# Patient Record
Sex: MALE | Race: WHITE | NOT HISPANIC OR LATINO | Employment: OTHER | URBAN - METROPOLITAN AREA
[De-identification: names, ages, dates, MRNs, and addresses within clinical notes are randomized per-mention and may not be internally consistent; named-entity substitution may affect disease eponyms.]

---

## 2017-11-09 ENCOUNTER — OFFICE VISIT (OUTPATIENT)
Dept: RHEUMATOLOGY | Facility: CLINIC | Age: 77
End: 2017-11-09
Payer: MEDICARE

## 2017-11-09 VITALS
HEART RATE: 75 BPM | BODY MASS INDEX: 27.72 KG/M2 | WEIGHT: 198 LBS | SYSTOLIC BLOOD PRESSURE: 111 MMHG | HEIGHT: 71 IN | DIASTOLIC BLOOD PRESSURE: 72 MMHG

## 2017-11-09 DIAGNOSIS — L50.9 URTICARIA: Primary | ICD-10-CM

## 2017-11-09 DIAGNOSIS — R76.8 POSITIVE ANA (ANTINUCLEAR ANTIBODY): ICD-10-CM

## 2017-11-09 PROCEDURE — 99999 PR PBB SHADOW E&M-EST. PATIENT-LVL III: CPT | Mod: PBBFAC,,, | Performed by: INTERNAL MEDICINE

## 2017-11-09 PROCEDURE — 99215 OFFICE O/P EST HI 40 MIN: CPT | Mod: S$PBB,,, | Performed by: INTERNAL MEDICINE

## 2017-11-09 PROCEDURE — 99213 OFFICE O/P EST LOW 20 MIN: CPT | Mod: PBBFAC,PO | Performed by: INTERNAL MEDICINE

## 2017-11-09 RX ORDER — PREDNISONE 5 MG/1
TABLET ORAL
Qty: 40 TABLET | Refills: 3 | Status: SHIPPED | OUTPATIENT
Start: 2017-11-09 | End: 2018-03-07

## 2017-11-09 RX ORDER — HYDROXYCHLOROQUINE SULFATE 200 MG/1
200 TABLET, FILM COATED ORAL 2 TIMES DAILY
Qty: 180 TABLET | Refills: 3 | Status: SHIPPED | OUTPATIENT
Start: 2017-11-09 | End: 2018-03-07 | Stop reason: SDUPTHER

## 2017-11-09 NOTE — PROGRESS NOTES
Subjective:          Chief Complaint: Kuldip Espinal is a 77 y.o. male who had concerns including Disease Management.    HPI:    Very pleasant 76y/o gentleman referred for + GEORGES and reported urticaria.   Rash returned March 2017 and past 5-6 weeks worse. Did have biopsy with Dr. Any Vitale/Dr. lCint Gastelum did biopsy on right arm. - questioned any drug to attribute this to. But stopped meds and not difference.   Dr. garcia-Immunogy/ seen twice considering Xolair  He has been on topical steroids. Steroids oral did help flares when comes off  Right hand with some excoritation looks like dishidrotic eczema  Scalp with scale.     No weakness, no fevers. Some chills. No SOB, no cough,   Irritation around his eyes itching and heavy but no rahs.     He is having slight fever.      Patient is here today to review lab work which showed a positive GEORGES 1-1280 homogeneous pattern with a negative GEORGES profile. Patient not having rashes at this time. He does note he has hx of eczema that is pruritic, typically on arms with scaling but denies erythema and severe scale. Rash seems to be exacerbated with stress, not sunlight. No involvement of scalp, ears, groin. He was seen by dermatology given topicals and this did help his rash to resolve. He denies dry eyes, nasal or palatal ulcerations, lymphadenopathy, serositis, photosensitivity.+ hx of significant CAD with 7 stents currently on Plavix. He denies seizure, stroke, anemia, nephritis, other rashes, Raynaud's, IBD, hx of thyroid disease. He denies swelling of joints and major pain generator was left knee with resolution of pain following TKA with Dr. Suárez. No morning stiffness or swelling of joints.       REVIEW OF SYSTEMS:    Review of Systems   Constitutional: Negative for fever, malaise/fatigue and weight loss.   HENT: Negative for sore throat.    Eyes: Negative for double vision, photophobia and redness.   Respiratory: Negative for cough, shortness of breath and  wheezing.    Cardiovascular: Negative for chest pain, palpitations and orthopnea.   Gastrointestinal: Negative for abdominal pain, constipation and diarrhea.   Genitourinary: Negative for dysuria, hematuria and urgency.   Musculoskeletal: Negative for back pain, joint pain, myalgias and neck pain.   Skin: Negative for rash.   Neurological: Negative for dizziness, tingling, focal weakness and headaches.   Endo/Heme/Allergies: Does not bruise/bleed easily.   Psychiatric/Behavioral: Negative for depression, hallucinations and suicidal ideas.               Objective:            Past Medical History:   Diagnosis Date    a CAD With Stenting     Dr[facility] Eleuterio Knott [facility]    b Hypertension     Norvasc 5 mg D/Cd On 8/28/15    b Stage 3 CKD     Dr. Kuldip hollis Hypercholesterolemia With Low HDL     On Crestor 20 Mg qHS    c Hypertriglyceridemia     On Lovaza 2 Gm Bid    d Type 2 DM     5/24/17 RXd Amaryl 4 Mg Bid And D/Cd Glucotrol 10 Mg Bid; 11/22/16 Added Januvia 50 Mg Daily To His Glucotrol 10 Mg Bid; He Has Had Full ADA Training With Bree Shepard    i Pulmonary Interstitial Fibrosis On 03/2015 Chest CT     j Asymptomatic cholelithiasis     On 03/2015 Chest CT     j Colon CA S/P Partial Colectomy 2008     Dr. Hayes benavidez Diverticulosis     Dr. Hayes martinez BPH     Dr. Norris Florian; On Cialis 5 Mg Daily For This    k Elevated PSA Levels     Dr. Norris Florian; I Always Send Lab Results To Dr. Norris art GEORGES positive     Dr. Adeola art Chronic left shoulder pain     Dr. Heike art H/O Left TKR 02/2013     Dr. Heike art Lumbar Spinal DDD     I Will Refer Him To Neurosurgery    l Thoracic Spinal DDD     On 03/2015 Chest CT     n Situational Anxiety And Depression     RTC 6 Weeks; RXd Wellbutrin  Mg qAM; Lexapro Also Worked Well    q Chronic Eczema     q Toenail Onychomycosis (07/2015)     RXd Lamisil 7/17/15    q Urticaria 12/2014     Dr. Mir  Mello winkler Vitamin D deficiency     Wellness Visit 5/24/2017      Family History   Problem Relation Age of Onset    Cancer Mother     Heart disease Father     Hyperlipidemia Father     Hypertension Father      Social History   Substance Use Topics    Smoking status: Former Smoker    Smokeless tobacco: Not on file    Alcohol use Not on file         Current Outpatient Prescriptions on File Prior to Visit   Medication Sig Dispense Refill    amlodipine (NORVASC) 5 MG tablet Take 1 tablet (5 mg total) by mouth once daily. 90 tablet 3    aspirin (ECOTRIN) 81 MG EC tablet Take 1 tablet by mouth once daily.      blood sugar diagnostic (BLOOD GLUCOSE TEST) Strp MARKIE CONTOUR NEXT TEST STRP      blood sugar diagnostic (BLOOD GLUCOSE TEST) [facility] MARKIE MICROLET LANCETS MISC      cholecalciferol, vitamin D3, 5,000 unit capsule Take 1 capsule by mouth once daily.      CIALIS 5 mg tablet Take 5 mg by mouth once daily.  99    clopidogrel (PLAVIX) 75 mg tablet Take 1 tablet by mouth once daily.      clotrimazole-betamethasone 1-0.05% (LOTRISONE) cream Apply topically 2 (two) times daily. Apply topically to affected area bid 45 g 0    CRESTOR 20 mg tablet Take 1 tablet by mouth once daily.      glimepiride (AMARYL) 4 MG tablet Take 1 tablet (4 mg total) by mouth before breakfast. 180 tablet 3    hydrochlorothiazide (HYDRODIURIL) 25 MG tablet Take 1 tablet by mouth once daily.      metoprolol succinate (TOPROL-XL) 25 MG 24 hr tablet TAKE 1 TABLET EVERY MORNING 90 tablet 1    omega-3 acid ethyl esters (LOVAZA) 1 gram capsule Take 2 capsules by mouth 2 (two) times daily.      ramipril (ALTACE) 10 MG capsule Take 10 mg by mouth 2 (two) times daily.  2    SITagliptan (JANUVIA) 50 MG Tab Take 1 tablet (50 mg total) by mouth once daily. 90 tablet 3    vitamin A-vitamin C-vit E-min Tab [facility]       No current facility-administered medications on file prior to visit.        Vitals:    11/09/17  1555   BP: 111/72   Pulse: 75       Physical Exam:    Physical Exam   Constitutional: He appears well-developed and well-nourished.   HENT:   Head: Atraumatic.   Nose: No nasal deformity.   Mouth/Throat: No oral lesions. No dental caries.   Eyes: Pupils are equal, round, and reactive to light. Right conjunctiva is not injected. Left conjunctiva is not injected.   Neck: No JVD present.   Cardiovascular: Normal rate and regular rhythm.    Pulmonary/Chest: Effort normal and breath sounds normal.   Abdominal: Soft. Bowel sounds are normal. There is no hepatosplenomegaly.   Musculoskeletal:        Right shoulder: He exhibits normal range of motion, no tenderness, no effusion and no crepitus.        Left shoulder: He exhibits normal range of motion, no tenderness, no effusion and no crepitus.        Right elbow: He exhibits normal range of motion and no effusion. No tenderness found.        Left elbow: He exhibits normal range of motion and no effusion. No tenderness found.        Right wrist: He exhibits normal range of motion, no tenderness and no swelling.        Left wrist: He exhibits normal range of motion, no tenderness and no swelling.        Right hip: He exhibits normal range of motion, no tenderness and no swelling.        Left hip: He exhibits normal range of motion, no tenderness and no swelling.        Right knee: He exhibits normal range of motion and no swelling. No tenderness found.        Left knee: He exhibits normal range of motion and no swelling. No tenderness found.        Right ankle: He exhibits normal range of motion and no swelling. No tenderness.        Left ankle: He exhibits normal range of motion and no swelling. No tenderness.   Left knee hx of TKA  [place] nodes and ned nodes PIP and DIP joints.   Lymphadenopathy:     He has no cervical adenopathy.   Neurological: He has normal strength.   Skin: Skin is warm, dry and intact.   Psychiatric: He has a normal mood and affect.              Assessment:       No diagnosis found.       Plan:        GEORGES positive       - Patient with high titer +GEORGES and no evidence of major organ involvement or arthralgias. Rash most suggestive by history of eczema vs psoriasis. Discussed there is no indication for therapy at this time. Discussed typic symptoms that would warrant re-evaluation or therapy.   -? If this is Pressure urticaria, no vasculitis              No Follow-up on file.

## 2017-11-15 ENCOUNTER — TELEPHONE (OUTPATIENT)
Dept: RHEUMATOLOGY | Facility: CLINIC | Age: 77
End: 2017-11-15

## 2017-11-15 NOTE — TELEPHONE ENCOUNTER
----- Message from Shelia Carranza sent at 11/14/2017 10:17 AM CST -----  Patient called to informed he is scheduled with Dr. King on 11/16/17 at 3:30, contact, contact patient at [phone],  He also wanted to give Dr. Nia King phone # [phone]       tahnk you

## 2017-11-15 NOTE — TELEPHONE ENCOUNTER
Returned patient's call. Patient states Dr. Sarkar was suppose to contact patient's immunologist Dr. Lal regarding starting Xolair injection. Patient called to office to inform he has appt tomorrow 11/16 @ 3:30 with Dr. Presley if Dr. Sarkar would like to get in touch with Dr. Presley.

## 2017-11-16 NOTE — PROGRESS NOTES
Subjective:          Chief Complaint: Kuldip Espinal is a 77 y.o. male who had concerns including Disease Management.    HPI:      Very pleasant 76y/o gentleman referred for + GEORGES and reported urticaria.   Rash returned March 2017 and past 5-6 weeks worse. Did have biopsy with Dr. Any Vitale/Dr. Clint Gastelum did biopsy on right arm. - questioned any drug to attribute this to. But stopped meds and not difference.   Dr. garcia-Immunogy/ seen twice considering Xolair  He has been on topical steroids. Steroids oral did help flares when comes off  Right hand with some excoritation looks like dishidrotic eczema  Scalp with scale.     No weakness, no fevers. Some chills. No SOB, no cough,   Irritation around his eyes itching and heavy but no rahs.     He is having slight fever.      Patient is here today to review lab work which showed a positive GEORGES 1-1280 homogeneous pattern with a negative GEORGES profile. Patient not having rashes at this time. He does note he has hx of eczema that is pruritic, typically on arms with scaling but denies erythema and severe scale. Rash seems to be exacerbated with stress, not sunlight. No involvement of scalp, ears, groin. He was seen by dermatology given topicals and this did help his rash to resolve. He denies dry eyes, nasal or palatal ulcerations, lymphadenopathy, serositis, photosensitivity.+ hx of significant CAD with 7 stents currently on Plavix. He denies seizure, stroke, anemia, nephritis, other rashes, Raynaud's, IBD, hx of thyroid disease. He denies swelling of joints and major pain generator was left knee with resolution of pain following TKA with Dr. Suárez. No morning stiffness or swelling of joints.         Very pleasant 76y/o gentleman referred for + GEORGES and reported urticaria.  Patient is here today to review lab work which showed a positive GEORGES 1-1280 homogeneous pattern with a negative GEORGES profile. Patient not having rashes at this time. He does note he has hx  of eczema that is pruritic, typically on arms with scaling but denies erythema and severe scale. Rash seems to be exacerbated with stress, not sunlight. No involvement of scalp, ears, groin. He was seen by dermatology given topicals and this did help his rash to resolve. He denies dry eyes, nasal or palatal ulcerations, lymphadenopathy, serositis, photosensitivity.+ hx of significant CAD with 7 stents currently on Plavix. He denies seizure, stroke, anemia, nephritis, other rashes, Raynaud's, IBD, hx of thyroid disease. He denies swelling of joints and major pain generator was left knee with resolution of pain following TKA with Dr. Suárez. No morning stiffness or swelling of joints.       REVIEW OF SYSTEMS:    Review of Systems   Constitutional: Negative for fever, malaise/fatigue and weight loss.   HENT: Negative for sore throat.    Eyes: Negative for double vision, photophobia and redness.   Respiratory: Negative for cough, shortness of breath and wheezing.    Cardiovascular: Negative for chest pain, palpitations and orthopnea.   Gastrointestinal: Negative for abdominal pain, constipation and diarrhea.   Genitourinary: Negative for dysuria, hematuria and urgency.   Musculoskeletal: Negative for back pain, joint pain and myalgias.   Skin: Positive for itching and rash.   Neurological: Negative for dizziness, tingling, focal weakness and headaches.   Endo/Heme/Allergies: Does not bruise/bleed easily.   Psychiatric/Behavioral: Negative for depression, hallucinations and suicidal ideas.               Objective:            Past Medical History:   Diagnosis Date    a CAD With Stenting     Dr[facility]    b Hypertension     Norvasc 5 mg D/Cd On 8/28/15    b Stage 3 CKD     Dr. Kuldip hollis Hypercholesterolemia With Low HDL     On Crestor 20 Mg qHS    c Hypertriglyceridemia     On Lovaza 2 Gm Bid    d Type 2 DM     5/24/17 RXd Amaryl 4 Mg Bid And D/Cd Glucotrol 10 Mg Bid; 11/22/16 Added Januvia 50  Mg Daily To His Glucotrol 10 Mg Bid; He Has Had Full ADA Training With Bree Muhammad i Pulmonary Interstitial Fibrosis On 03/2015 Chest CT     j Asymptomatic cholelithiasis     On 03/2015 Chest CT     j Colon CA S/P Partial Colectomy 2008     Dr. Hayes benavidez Diverticulosis     Dr. Hayes martinez BPH     Dr. Norris Florian; On Cialis 5 Mg Daily For This    k Elevated PSA Levels     Dr. Norris Florian; I Always Send Lab Results To Dr. Norris art GEORGES positive     Dr. Adeola art Chronic left shoulder pain     Dr. Heike art H/O Left TKR 02/2013     Dr. Heike art Lumbar Spinal DDD     I Will Refer Him To Neurosurgery    l Thoracic Spinal DDD     On 03/2015 Chest CT     n Situational Anxiety And Depression     RTC 6 Weeks; RXd Wellbutrin  Mg qAM; Lexapro Also Worked Well    q Chronic Eczema     q Toenail Onychomycosis (07/2015)     RXd Lamisil 7/17/15    q Urticaria 12/2014     Dr. Adeola winkler Vitamin D deficiency     Wellness Visit 5/24/2017      Family History   Problem Relation Age of Onset    Cancer Mother     Heart disease Father     Hyperlipidemia Father     Hypertension Father      Social History   Substance Use Topics    Smoking status: Former Smoker    Smokeless tobacco: Not on file    Alcohol use Not on file         Current Outpatient Prescriptions on File Prior to Visit   Medication Sig Dispense Refill    amlodipine (NORVASC) 5 MG tablet Take 1 tablet (5 mg total) by mouth once daily. 90 tablet 3    aspirin (ECOTRIN) 81 MG EC tablet Take 1 tablet by mouth once daily.      blood sugar diagnostic (BLOOD GLUCOSE TEST) Strp MARKIE CONTOUR NEXT TEST STRP      blood sugar diagnostic (BLOOD GLUCOSE TEST) Strp MARKIE MICROLET LANCETS MISC      cholecalciferol, vitamin D3, 5,000 unit capsule Take 1 capsule by mouth once daily.      CIALIS 5 mg tablet Take 5 mg by mouth once daily.  99    clopidogrel (PLAVIX) 75 mg tablet Take 1  tablet by mouth once daily.      clotrimazole-betamethasone 1-0.05% (LOTRISONE) cream Apply topically 2 (two) times daily. Apply topically to affected area bid 45 g 0    CRESTOR 20 mg tablet Take 1 tablet by mouth once daily.      glimepiride (AMARYL) 4 MG tablet Take 1 tablet (4 mg total) by mouth before breakfast. 180 tablet 3    hydrochlorothiazide (HYDRODIURIL) 25 MG tablet Take 1 tablet by mouth once daily.      metoprolol succinate (TOPROL-XL) 25 MG 24 hr tablet TAKE 1 TABLET EVERY MORNING 90 tablet 1    omega-3 acid ethyl esters (LOVAZA) 1 gram capsule Take 2 capsules by mouth 2 (two) times daily.      ramipril (ALTACE) 10 MG capsule Take 10 mg by mouth 2 (two) times daily.  2    SITagliptan (JANUVIA) 50 MG Tab Take 1 tablet (50 mg total) by mouth once daily. 90 tablet 3    vitamin A-vitamin C-vit E-min Tab [facility]       No current facility-administered medications on file prior to visit.        Vitals:    11/09/17 1555   BP: 111/72   Pulse: 75       Physical Exam:    Physical Exam   Constitutional: He is oriented to person, place, and time. He appears well-developed and well-nourished.   HENT:   Head: Normocephalic.   Right Ear: Hearing normal.   Left Ear: Hearing normal.   Nose: No rhinorrhea.   Mouth/Throat: Uvula is midline, oropharynx is clear and moist and mucous membranes are normal.   Eyes: Conjunctivae and EOM are normal. Pupils are equal, round, and reactive to light.   Cardiovascular: Normal rate, regular rhythm and normal heart sounds.    Pulmonary/Chest: Effort normal and breath sounds normal.   Musculoskeletal:        Right shoulder: He exhibits normal range of motion, no tenderness and no swelling.        Left shoulder: He exhibits normal range of motion, no tenderness and no swelling.        Right wrist: He exhibits normal range of motion, no tenderness and no swelling.        Left wrist: He exhibits normal range of motion, no tenderness and no swelling.         Right knee: He exhibits normal range of motion and no swelling. No tenderness found.        Left knee: He exhibits normal range of motion and no swelling. No tenderness found.        Right ankle: He exhibits normal range of motion and no swelling. No tenderness.        Left ankle: He exhibits normal range of motion and no swelling. No tenderness.        Right hand: He exhibits normal range of motion, no tenderness and no swelling.        Left hand: He exhibits normal range of motion, no tenderness and no swelling.        Right foot: There is normal range of motion, no tenderness and no swelling.        Left foot: There is normal range of motion, no tenderness and no swelling.   Neurological: He is oriented to person, place, and time. He has normal strength.   Skin: Skin is warm, dry and intact.   Diffuse urticaria ABD, arms, legs.face scalp   Psychiatric: He has a normal mood and affect. His speech is normal and behavior is normal. Cognition and memory are normal.             Assessment:       Encounter Diagnoses   Name Primary?    Urticaria Yes    Positive GEORGES (antinuclear antibody)           Plan:        Urticaria  -     hydroxychloroquine (PLAQUENIL) 200 mg tablet; Take 1 tablet (200 mg total) by mouth 2 (two) times daily.  Dispense: 180 tablet; Refill: 3  -     Sedimentation rate, manual; Future; Expected date: 11/09/2017  -     C-reactive protein; Future; Expected date: 11/23/2017  -     Sjogrens syndrome-A extractable nuclear antibody; Future; Expected date: 11/09/2017  -     Anti-neutrophilic cytoplasmic antibody; Future; Expected date: 11/09/2017  -     Sjogrens syndrome-B extractable nuclear antibody; Future; Expected date: 11/09/2017  -     Proteinase 3 Autoantibodies; Future; Expected date: 11/09/2017  -     Myeloperoxidase Antibody (MPO); Future; Expected date: 11/09/2017    Positive GEORGES (antinuclear antibody)  -     hydroxychloroquine (PLAQUENIL) 200 mg tablet; Take 1 tablet (200 mg total) by mouth 2  (two) times daily.  Dispense: 180 tablet; Refill: 3  -     Sedimentation rate, manual; Future; Expected date: 11/09/2017  -     C-reactive protein; Future; Expected date: 11/23/2017  -     Sjogrens syndrome-A extractable nuclear antibody; Future; Expected date: 11/09/2017  -     Anti-neutrophilic cytoplasmic antibody; Future; Expected date: 11/09/2017  -     Sjogrens syndrome-B extractable nuclear antibody; Future; Expected date: 11/09/2017  -     Proteinase 3 Autoantibodies; Future; Expected date: 11/09/2017  -     Myeloperoxidase Antibody (MPO); Future; Expected date: 11/09/2017    Other orders  -     predniSONE (DELTASONE) 5 MG tablet; 2 tabs daily for 10 days , then 1 tab daily for 20 days  Dispense: 40 tablet; Refill: 3    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   No clinical evidence for SLE/no patho to suggest cutaneous lupus.    -Cannot ascertain any specific medication or exposure to attribute to hypersensitivity.    -++eosinophila    -discussed with patient that unclear etiology of rash, but HCQ can help in some cases.   Ok should he want to start Xolair.       Return in about 4 weeks (around 12/7/2017).        40min consultation with greater than 50% spent in counseling, chart review and coordination of care. All questions answered.

## 2017-11-16 NOTE — PROGRESS NOTES
Subjective:          Chief Complaint: Kuldip Espinal is a 77 y.o. male who had concerns including Disease Management.    HPI:    Very pleasant 76y/o gentleman referred for + GEORGES (no lab received to check titer or pattern) and reported urticaria. Patient not having rashes at this time. He does note he has hx of eczema that is pruritic, typically on arms with scaling but denies erythema and severe scale. Rash seems to be exacerbated with stress, not sunlight. No involvement of scalp, ears, groin. He was seen by dermatology given topicals and this did help his rash to resolve. He denies dry eyes, nasal or palatal ulcerations, lymphadenopathy, serositis, photosensitivity.+ hx of significant CAD with 7 stents currently on Plavix. He denies seizure, stroke, anemia, nephritis, other rashes, Raynaud's, IBD, hx of thyroid disease. He denies swelling of joints and major pain generator was left knee with resolution of pain following TKA with Dr. Suárez. No morning stiffness or swelling of joints.     REVIEW OF SYSTEMS:    Review of Systems   Constitutional: Negative for fever, malaise/fatigue and weight loss.   HENT: Negative for sore throat.    Eyes: Negative for double vision, photophobia and redness.   Respiratory: Negative for cough, shortness of breath and wheezing.    Cardiovascular: Negative for chest pain, palpitations and orthopnea.   Gastrointestinal: Negative for abdominal pain, constipation and diarrhea.   Genitourinary: Negative for dysuria, hematuria and urgency.   Musculoskeletal: Negative for back pain, joint pain, myalgias and neck pain.   Skin: Negative for rash.   Neurological: Negative for dizziness, tingling, focal weakness and headaches.   Endo/Heme/Allergies: Does not bruise/bleed easily.   Psychiatric/Behavioral: Negative for depression, hallucinations and suicidal ideas.               Objective:            Past Medical History:   Diagnosis Date    a CAD With Stenting     Dr. Eleuterio Knott At [facility]     b Hypertension     Norvasc 5 mg D/Cd On 8/28/15    b Stage 3 CKD     Dr. Kuldip hollis Hypercholesterolemia With Low HDL     On Crestor 20 Mg qHS    c Hypertriglyceridemia     On Lovaza 2 Gm Bid    d Type 2 DM     5/24/17 RXd Amaryl 4 Mg Bid And D/Cd Glucotrol 10 Mg Bid; 11/22/16 Added Januvia 50 Mg Daily To His Glucotrol 10 Mg Bid; He Has Had Full ADA Training With Bree Muhammad i Pulmonary Interstitial Fibrosis On 03/2015 Chest CT     j Asymptomatic cholelithiasis     On 03/2015 Chest CT     j Colon CA S/P Partial Colectomy 2008     Dr. Hayes benavidez Diverticulosis     Dr. Hayes martinez BPH     Dr. Norris Florian; On Cialis 5 Mg Daily For This    k Elevated PSA Levels     Dr. Norris Florian; I Always Send Lab Results To Dr. Norris art GEORGES positive     Dr. Adeola art Chronic left shoulder pain     Dr. Heike art H/O Left TKR 02/2013     Dr. Heike art Lumbar Spinal DDD     I Will Refer Him To Neurosurgery    l Thoracic Spinal DDD     On 03/2015 Chest CT     n Situational Anxiety And Depression     RTC 6 Weeks; RXd Wellbutrin  Mg qAM; Lexapro Also Worked Well    q Chronic Eczema     q Toenail Onychomycosis (07/2015)     RXd Lamisil 7/17/15    q Urticaria 12/2014     Dr. Adeola winkler Vitamin D deficiency     Wellness Visit 5/24/2017      Family History   Problem Relation Age of Onset    Cancer Mother     Heart disease Father     Hyperlipidemia Father     Hypertension Father      Social History   Substance Use Topics    Smoking status: Former Smoker    Smokeless tobacco: Not on file    Alcohol use Not on file         Current Outpatient Prescriptions on File Prior to Visit   Medication Sig Dispense Refill    amlodipine (NORVASC) 5 MG tablet Take 1 tablet (5 mg total) by mouth once daily. 90 tablet 3    aspirin (ECOTRIN) 81 MG EC tablet Take 1 tablet by mouth once daily.      blood sugar diagnostic (BLOOD GLUCOSE TEST) Strp  MARKIE CONTOUR NEXT TEST STRP      blood sugar diagnostic (BLOOD GLUCOSE TEST) Strp MARKIE MICROLET LANCETS MISC      cholecalciferol, vitamin D3, 5,000 unit capsule Take 1 capsule by mouth once daily.      CIALIS 5 mg tablet Take 5 mg by mouth once daily.  99    clopidogrel (PLAVIX) 75 mg tablet Take 1 tablet by mouth once daily.      clotrimazole-betamethasone 1-0.05% (LOTRISONE) cream Apply topically 2 (two) times daily. Apply topically to affected area bid 45 g 0    CRESTOR 20 mg tablet Take 1 tablet by mouth once daily.      glimepiride (AMARYL) 4 MG tablet Take 1 tablet (4 mg total) by mouth before breakfast. 180 tablet 3    hydrochlorothiazide (HYDRODIURIL) 25 MG tablet Take 1 tablet by mouth once daily.      metoprolol succinate (TOPROL-XL) 25 MG 24 hr tablet TAKE 1 TABLET EVERY MORNING 90 tablet 1    omega-3 acid ethyl esters (LOVAZA) 1 gram capsule Take 2 capsules by mouth 2 (two) times daily.      ramipril (ALTACE) 10 MG capsule Take 10 mg by mouth 2 (two) times daily.  2    SITagliptan (JANUVIA) 50 MG Tab Take 1 tablet (50 mg total) by mouth once daily. 90 tablet 3    vitamin A-vitamin C-vit E-min Tab OCUVITE EYE HEALTH FORMULA CAPS       No current facility-administered medications on file prior to visit.        Vitals:    11/09/17 1555   BP: 111/72   Pulse: 75       Physical Exam:    Physical Exam   Constitutional: He appears well-developed and well-nourished.   HENT:   Head: Atraumatic.   Nose: No nasal deformity.   Mouth/Throat: No oral lesions. No dental caries.   Eyes: Pupils are equal, round, and reactive to light. Right conjunctiva is not injected. Left conjunctiva is not injected.   Neck: No JVD present.   Cardiovascular: Normal rate and regular rhythm.    Pulmonary/Chest: Effort normal and breath sounds normal.   Abdominal: Soft. Bowel sounds are normal. There is no hepatosplenomegaly.   Musculoskeletal:        Right shoulder: He exhibits normal range of motion, no tenderness, no  effusion and no crepitus.        Left shoulder: He exhibits normal range of motion, no tenderness, no effusion and no crepitus.        Right elbow: He exhibits normal range of motion and no effusion. No tenderness found.        Left elbow: He exhibits normal range of motion and no effusion. No tenderness found.        Right wrist: He exhibits normal range of motion, no tenderness and no swelling.        Left wrist: He exhibits normal range of motion, no tenderness and no swelling.        Right hip: He exhibits normal range of motion, no tenderness and no swelling.        Left hip: He exhibits normal range of motion, no tenderness and no swelling.        Right knee: He exhibits normal range of motion and no swelling. No tenderness found.        Left knee: He exhibits normal range of motion and no swelling. No tenderness found.        Right ankle: He exhibits normal range of motion and no swelling. No tenderness.        Left ankle: He exhibits normal range of motion and no swelling. No tenderness.   Left knee hx of TKA  [place]berden nodes and ned nodes PIP and DIP joints.   Lymphadenopathy:     He has no cervical adenopathy.   Neurological: He has normal strength.   Skin: Skin is warm, dry and intact.   Psychiatric: He has a normal mood and affect.             Assessment:       Encounter Diagnoses   Name Primary?    Urticaria Yes    Positive GEORGES (antinuclear antibody)           Plan:        GEORGES positive  Orders:  -     GEORGES; Future; Expected date: 1/26/16  -     Anti Sm/RNP Antibody; Future; Expected date: 1/26/16  -     Anti-DNA antibody, double-stranded; Future; Expected date: 1/26/16  -     Anti-Histone Antibody; Future; Expected date: 1/26/16  -     Anti-Smith antibody; Future; Expected date: 1/26/16  -     Beta 2 Glycoprotein I Antibody, IgA; Future; Expected date: 1/26/16  -     C3 complement; Future; Expected date: 1/26/16  -     C4 complement; Future; Expected date: 1/26/16  -     Sjogrens syndrome-B  extractable nuclear antibody; Future; Expected date: 1/26/16  -     Sjogrens syndrome-A extractable nuclear antibody; Future; Expected date: 1/26/16  -     Complement, total; Future; Expected date: 1/26/16  -     Rheumatoid factor; Future; Expected date: 2/7/16    Coronary artery disease due to lipid rich plaque - not an NSAID candidate. Discussed APAP for any arthralgias    Very pleasant 74 y/o gentleman with historical +GEORGES and no clinical evidence of SLE or other CTD. 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 Will repeat GEORGES profile. If + would check for thyroid antibodies or suspect incidental.    Return in about 4 weeks (around 12/7/2017).

## 2017-11-22 ENCOUNTER — TELEPHONE (OUTPATIENT)
Dept: RHEUMATOLOGY | Facility: CLINIC | Age: 77
End: 2017-11-22

## 2017-11-22 NOTE — TELEPHONE ENCOUNTER
----- Message from Laxmi Guillory sent at 11/22/2017  8:12 AM CST -----  Contact: Kuldip Boateng is calling to ask for lab orders to be sent to [facility] on [address], fax [phone] as he misplaced the ones he had been given. Thanks!     LM on VM that labs were faxed to [facility] 9:51 AM. Call back number is provided if needed. TERRENCE

## 2017-11-29 ENCOUNTER — TELEPHONE (OUTPATIENT)
Dept: RHEUMATOLOGY | Facility: CLINIC | Age: 77
End: 2017-11-29

## 2017-11-29 NOTE — TELEPHONE ENCOUNTER
----- Message from Marie Rodriguez sent at 11/29/2017  9:34 AM CST -----  Contact: Shawnee freedman/Pankaj [phone]  Please call her, she needs to know what the Anti-neutrophilic cytoplasmic antibody is.  It is not pulling up in her system.  Patient is there waiting.  Call placed to pod and instant messaged. Thank you!    Spoke to Shawnee-she found under ANCA. CG

## 2017-12-04 ENCOUNTER — OFFICE VISIT (OUTPATIENT)
Dept: RHEUMATOLOGY | Facility: CLINIC | Age: 77
End: 2017-12-04
Payer: MEDICARE

## 2017-12-04 VITALS
HEART RATE: 76 BPM | SYSTOLIC BLOOD PRESSURE: 140 MMHG | HEIGHT: 71 IN | DIASTOLIC BLOOD PRESSURE: 80 MMHG | BODY MASS INDEX: 27.4 KG/M2 | WEIGHT: 195.75 LBS

## 2017-12-04 DIAGNOSIS — R76.8 ANA POSITIVE: Primary | ICD-10-CM

## 2017-12-04 DIAGNOSIS — L30.9 ECZEMA, UNSPECIFIED TYPE: ICD-10-CM

## 2017-12-04 PROCEDURE — 99213 OFFICE O/P EST LOW 20 MIN: CPT | Mod: PBBFAC,PO | Performed by: INTERNAL MEDICINE

## 2017-12-04 PROCEDURE — 99999 PR PBB SHADOW E&M-EST. PATIENT-LVL III: CPT | Mod: PBBFAC,,, | Performed by: INTERNAL MEDICINE

## 2017-12-04 PROCEDURE — 99214 OFFICE O/P EST MOD 30 MIN: CPT | Mod: S$PBB,,, | Performed by: INTERNAL MEDICINE

## 2017-12-04 NOTE — PATIENT INSTRUCTIONS
Selsun Blue every other day  Neutrogena Tgel every other day.   Steroid shampoo every day for 10 days.

## 2017-12-04 NOTE — PROGRESS NOTES
Subjective:          Chief Complaint: Kuldip Espinal is a 77 y.o. male who had concerns including Disease Management.    HPI:      Very pleasant 74y/o gentleman referred for + GEORGES and reported urticaria. Sking better on Pred taper with HCQ. No ASE. Labs for ANCA negative. ESR WNL, CRP 18.8.   Scalp still pruritic but 90% improved on remaining skin with exception of pruritis.   Rash returned March 2017 and past 5-6 weeks worse.   Hx of eczema which I think is what is remaining at this time.   Did have biopsy with Dr. Any Vitale/Dr. Clint Gastelum did biopsy on right arm. - questioned any drug to attribute this to. But stopped meds and not difference.   Dr. Rogel-Immunogy/ seen twice considering Xolair  He has been on topical steroids. Steroids oral did help flares when comes off  Right hand with some excoritation looks like dishidrotic eczema  Scalp with scale.     No weakness, no fevers. Some chills. No SOB, no cough,   Irritation around his eyes itching and heavy but no rahs.     GEORGES 1-1280 homogeneous pattern with a negative GEORGES profile. He does note he has hx of eczema that is pruritic, typically on arms with scaling but denies erythema and severe scale. Rash seems to be exacerbated with stress, not sunlight.  Patient denies weight loss, rashes, dry eye, dry mouth, nasal or palatal ulcerations,  lymphadenopathy, Raynaud's, hx of DVT/miscarriages, psoriasis or family hx of psoriasis, rashes, serositis, anemia or other constitutional symptoms.          REVIEW OF SYSTEMS:    Review of Systems   Constitutional: Negative for fever, malaise/fatigue and weight loss.   HENT: Negative for sore throat.    Eyes: Negative for double vision, photophobia and redness.   Respiratory: Negative for cough, shortness of breath and wheezing.    Cardiovascular: Negative for chest pain, palpitations and orthopnea.   Gastrointestinal: Negative for abdominal pain, constipation and diarrhea.   Genitourinary: Negative for  dysuria, hematuria and urgency.   Musculoskeletal: Negative for back pain, joint pain and myalgias.   Skin: Positive for itching and rash.   Neurological: Negative for dizziness, tingling, focal weakness and headaches.   Endo/Heme/Allergies: Does not bruise/bleed easily.   Psychiatric/Behavioral: Negative for depression, hallucinations and suicidal ideas.               Objective:            Past Medical History:   Diagnosis Date    a CAD With Stenting     [facility]    b Hypertension     Norvasc 5 mg D/Cd On 8/28/15    b Stage 3 CKD     Dr. Kuldip hollis Hypercholesterolemia With Low HDL     On Crestor 20 Mg qHS    c Hypertriglyceridemia     On Lovaza 2 Gm Bid    d Type 2 DM     5/24/17 RXd Amaryl 4 Mg Bid And D/Cd Glucotrol 10 Mg Bid; 11/22/16 Added Januvia 50 Mg Daily To His Glucotrol 10 Mg Bid; He Has Had Full ADA Training With Bree Muhammad i Pulmonary Interstitial Fibrosis On 03/2015 Chest CT     j Asymptomatic cholelithiasis     On 03/2015 Chest CT     j Colon CA S/P Partial Colectomy 2008     Dr. Hayes benavidez Diverticulosis     Dr. Hayes Restrepo    k BPH     Dr. Norris Florian; On Cialis 5 Mg Daily For This    k Elevated PSA Levels     Dr. Norris Florian; I Always Send Lab Results To Dr. Norris art GEORGES positive     Dr. Adeola art Chronic left shoulder pain     Dr. Heike art H/O Left TKR 02/2013     Dr. Heike art Lumbar Spinal DDD     I Will Refer Him To Neurosurgery    l Thoracic Spinal DDD     On 03/2015 Chest CT     n Situational Anxiety And Depression     RTC 6 Weeks; RXd Wellbutrin  Mg qAM; Lexapro Also Worked Well    q Chronic Eczema     Dr. Adeola Sarkar; Dr. Erna Gastelum    q Recurrent Urticaria Vasculitis     Dr. Adeola Sarkar    q Toenail Onychomycosis (07/2015)     RXd Lamisil 7/17/15    q Vitamin D deficiency     Wellness Visit 5/24/2017      Family History   Problem Relation Age of Onset    Cancer Mother      Heart disease Father     Hyperlipidemia Father     Hypertension Father      Social History   Substance Use Topics    Smoking status: Former Smoker    Smokeless tobacco: Not on file    Alcohol use Not on file         Current Outpatient Prescriptions on File Prior to Visit   Medication Sig Dispense Refill    amLODIPine (NORVASC) 2.5 MG tablet Take 2.5 mg by mouth every morning.  3    aspirin (ECOTRIN) 81 MG EC tablet Take 1 tablet by mouth once daily.      blood sugar diagnostic (BLOOD GLUCOSE TEST) Strp MARKIE CONTOUR NEXT TEST STRP      blood sugar diagnostic (BLOOD GLUCOSE TEST) [facility] MARKIE MICROLET LANCETS MISC      cetirizine (ZYRTEC) 10 MG tablet Take 10 mg by mouth every morning.  1    cholecalciferol, vitamin D3, 5,000 unit capsule Take 1 capsule by mouth once daily.      CIALIS 5 mg tablet Take 5 mg by mouth once daily.  99    clopidogrel (PLAVIX) 75 mg tablet Take 1 tablet by mouth once daily.      clotrimazole-betamethasone 1-0.05% (LOTRISONE) cream Apply topically 2 (two) times daily. Apply topically to affected area bid 45 g 0    CRESTOR 20 mg tablet Take 1 tablet by mouth once daily.      famotidine (PEPCID) 20 MG tablet Take 20 mg by mouth 2 (two) times daily.  6    fenofibric acid (FIBRICOR) 135 mg CpDR       glimepiride (AMARYL) 4 MG tablet Take 1 tablet (4 mg total) by mouth 2 (two) times daily. 180 tablet 3    hydrochlorothiazide (HYDRODIURIL) 25 MG tablet Take 1 tablet by mouth once daily.      hydroxychloroquine (PLAQUENIL) 200 mg tablet Take 1 tablet (200 mg total) by mouth 2 (two) times daily. 180 tablet 3    hydrOXYzine HCl (ATARAX) 25 MG tablet Take 25 mg by mouth every evening.  1    JANUVIA 50 mg Tab TAKE 1 TABLET (50 MG TOTAL) BY MOUTH ONCE DAILY. 90 tablet 1    metoprolol succinate (TOPROL-XL) 25 MG 24 hr tablet Take 1 tablet (25 mg total) by mouth every morning. 90 tablet 3    omega-3 acid ethyl esters (LOVAZA) 1 gram capsule Take 2 capsules by mouth 2 (two)  times daily.      predniSONE (DELTASONE) 5 MG tablet 2 tabs daily for 10 days , then 1 tab daily for 20 days 40 tablet 3    ramipril (ALTACE) 10 MG capsule Take 10 mg by mouth 2 (two) times daily.  2    tadalafil (CIALIS) 20 MG Tab Take 1 tablet (20 mg total) by mouth once daily. 88 tablet 3    vitamin A-vitamin C-vit E-min Tab OCUVITE EYE HEALTH FORMULA CAPS       No current facility-administered medications on file prior to visit.        Vitals:    12/04/17 1239   BP: (!) 140/80   Pulse: 76       Physical Exam:    Physical Exam   Constitutional: He is oriented to person, place, and time. He appears well-developed and well-nourished.   HENT:   Head: Normocephalic.   Right Ear: Hearing normal.   Left Ear: Hearing normal.   Nose: No rhinorrhea.   Mouth/Throat: Uvula is midline, oropharynx is clear and moist and mucous membranes are normal.   Eyes: Conjunctivae and EOM are normal. Pupils are equal, round, and reactive to light.   Cardiovascular: Normal rate, regular rhythm and normal heart sounds.    Pulmonary/Chest: Effort normal and breath sounds normal.   Musculoskeletal:        Right shoulder: He exhibits normal range of motion, no tenderness and no swelling.        Left shoulder: He exhibits normal range of motion, no tenderness and no swelling.        Right wrist: He exhibits normal range of motion, no tenderness and no swelling.        Left wrist: He exhibits normal range of motion, no tenderness and no swelling.        Right knee: He exhibits normal range of motion and no swelling. No tenderness found.        Left knee: He exhibits normal range of motion and no swelling. No tenderness found.        Right ankle: He exhibits normal range of motion and no swelling. No tenderness.        Left ankle: He exhibits normal range of motion and no swelling. No tenderness.        Right hand: He exhibits normal range of motion, no tenderness and no swelling.        Left hand: He exhibits normal range of motion, no  tenderness and no swelling.        Right foot: There is normal range of motion, no tenderness and no swelling.        Left foot: There is normal range of motion, no tenderness and no swelling.   Neurological: He is oriented to person, place, and time. He has normal strength.   Skin: Skin is warm, dry and intact.   No uriticarial rash of skin  Persistent xerosis consistent with eczema.    Psychiatric: He has a normal mood and affect. His speech is normal and behavior is normal. Cognition and memory are normal.             Assessment:       Encounter Diagnoses   Name Primary?    GEORGES positive Yes    Eczema, unspecified type           Plan:        GEORGES positive    Eczema, unspecified type  Comments:  ? urticaria vasculitis vs eczema    Other orders  -     fluocinolone (SYNALAR) 0.01 % Sham; Apply topically once daily.  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   No clinical evidence for SLE/no patho to suggest cutaneous lupus.    -Cannot ascertain any specific medication or exposure to attribute to hypersensitivity.    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    -discussed with patient that unclear etiology of rash, but HCQ can help in some cases.   -Selsun blue, Capex, and T-gel for next 10-14 days if not improving on scalp call Derm.           Return in about 3 months (around 3/4/2018).        30min consultation with greater than 50% spent in counseling, chart review and coordination of care. All questions answered.

## 2017-12-05 ENCOUNTER — TELEPHONE (OUTPATIENT)
Dept: RHEUMATOLOGY | Facility: CLINIC | Age: 77
End: 2017-12-05

## 2017-12-05 NOTE — TELEPHONE ENCOUNTER
----- Message from Junaid Burkett sent at 12/5/2017  1:58 PM CST -----  Contact: [phone]  Patient called asking to speak with the nurse regarding directions for prescribed shampoo .. Thanks!    Spoke to the patient after consulting with dermatology. It is recommended to lather and let sit 5 minutes before rinsing. The patient will try one bottle of the Synalar shampoo as it was $100. CG

## 2017-12-12 ENCOUNTER — TELEPHONE (OUTPATIENT)
Dept: RHEUMATOLOGY | Facility: CLINIC | Age: 77
End: 2017-12-12

## 2017-12-12 NOTE — TELEPHONE ENCOUNTER
----- Message from Kavon Soto sent at 12/12/2017  4:13 PM CST -----  Contact: Patient  Patient states that he is not feeling well, a little woozy and the medication seems that it's not agreeing with him.  It makes him nauseated.    It's the hydroxychloroquine (PLAQUENIL) 200 mg tablets.  Can you please call the patient back at [phone].  Thank you    Spoke to the patient who describes feeling these symptoms a few days after starting HCQ  11-9-17. He does not vomit, but gags on excessive saliva. He says nausea is the main side effect. Patient says the prescription shampoo is helping. Please advise. CG

## 2017-12-13 NOTE — TELEPHONE ENCOUNTER
Spoke to the patient and gave instructions to take HCQ once a day with largest meal. Patient agrees to try. CG

## 2018-03-07 ENCOUNTER — OFFICE VISIT (OUTPATIENT)
Dept: RHEUMATOLOGY | Facility: CLINIC | Age: 78
End: 2018-03-07
Payer: MEDICARE

## 2018-03-07 VITALS
HEIGHT: 71 IN | BODY MASS INDEX: 26.45 KG/M2 | HEART RATE: 61 BPM | SYSTOLIC BLOOD PRESSURE: 144 MMHG | WEIGHT: 188.94 LBS | DIASTOLIC BLOOD PRESSURE: 82 MMHG

## 2018-03-07 DIAGNOSIS — R76.8 POSITIVE ANA (ANTINUCLEAR ANTIBODY): ICD-10-CM

## 2018-03-07 DIAGNOSIS — L50.9 URTICARIA: ICD-10-CM

## 2018-03-07 PROCEDURE — 99213 OFFICE O/P EST LOW 20 MIN: CPT | Mod: PBBFAC,PO | Performed by: INTERNAL MEDICINE

## 2018-03-07 PROCEDURE — 99214 OFFICE O/P EST MOD 30 MIN: CPT | Mod: S$PBB,,, | Performed by: INTERNAL MEDICINE

## 2018-03-07 PROCEDURE — 99999 PR PBB SHADOW E&M-EST. PATIENT-LVL III: CPT | Mod: PBBFAC,,, | Performed by: INTERNAL MEDICINE

## 2018-03-07 RX ORDER — BLOOD-GLUCOSE CONTROL, NORMAL
EACH MISCELLANEOUS
Refills: 3 | COMMUNITY
Start: 2018-01-10

## 2018-03-07 RX ORDER — HYDROXYCHLOROQUINE SULFATE 200 MG/1
200 TABLET, FILM COATED ORAL DAILY
Qty: 30 TABLET | Refills: 11 | Status: SHIPPED | OUTPATIENT
Start: 2018-03-07 | End: 2018-07-18 | Stop reason: SDUPTHER

## 2018-03-07 NOTE — PATIENT INSTRUCTIONS
Decrease Hydroxychloroquine to 200mg once in the AM with a meal.     Call if this is still causing problems and I will adjust further.

## 2018-03-07 NOTE — PROGRESS NOTES
Subjective:          Chief Complaint: Kuldip Espinal is a 77 y.o. male who had concerns including Follow-up.    HPI:      Very pleasant 76y/o gentleman referred for + GEORGES and reported urticaria. Sking better on Pred taper with HCQ. No ASE. Labs for ANCA negative. ESR WNL, CRP 18.8.   Scalp still pruritic but 90% improved on remaining skin with exception of pruritis.   Rash returned March 2017 and past 5-6 weeks worse.   Hx of eczema which I think is what is remaining at this time.   Did have biopsy with Dr. Any Vitale/Dr. Clint Gastelum did biopsy on right arm. - questioned any drug to attribute this to. But stopped meds and not difference.   Dr. Rogel-Immunogy/ seen twice considering Xolair  He has been on topical steroids. Steroids oral did help flares when comes off  Right hand with some excoritation looks like dishidrotic eczema  Scalp with scale.     No weakness, no fevers. Some chills. No SOB, no cough,   Irritation around his eyes itching and heavy but no rahs.     GEORGES 1-1280 homogeneous pattern with a negative GEORGES profile. He does note he has hx of eczema that is pruritic, typically on arms with scaling but denies erythema and severe scale. Rash seems to be exacerbated with stress, not sunlight.  Patient denies weight loss, rashes, dry eye, dry mouth, nasal or palatal ulcerations,  lymphadenopathy, Raynaud's, hx of DVT/miscarriages, psoriasis or family hx of psoriasis, rashes, serositis, anemia or other constitutional symptoms.          REVIEW OF SYSTEMS:    Review of Systems   Constitutional: Negative for fever, malaise/fatigue and weight loss.   HENT: Negative for sore throat.    Eyes: Negative for double vision, photophobia and redness.   Respiratory: Negative for cough, shortness of breath and wheezing.    Cardiovascular: Negative for chest pain, palpitations and orthopnea.   Gastrointestinal: Negative for abdominal pain, constipation and diarrhea.   Genitourinary: Negative for dysuria,  hematuria and urgency.   Musculoskeletal: Negative for back pain, joint pain and myalgias.   Skin: Positive for itching and rash.   Neurological: Negative for dizziness, tingling, focal weakness and headaches.   Endo/Heme/Allergies: Does not bruise/bleed easily.   Psychiatric/Behavioral: Negative for depression, hallucinations and suicidal ideas.               Objective:            Past Medical History:   Diagnosis Date    a CAD With Stenting     Dr. Eleuterio Knott At [facility]    b Hypertension     Norvasc 5 mg D/Cd On 8/28/15    b Stage 3 CKD     Dr. Kuldip hollis Hypercholesterolemia With Low HDL     On Crestor 20 Mg qHS    c Hypertriglyceridemia     On Lovaza 2 Gm Bid    d Type 2 DM     12/5/17 RXd Trulicity 0.75 Mg SQ Weekly, With Repeat HgA1c In 4 Months; 5/24/17 RXd Amaryl 4 Mg Bid And D/Cd Glucotrol 10 Mg Bid; 11/22/16 Added Januvia 50 Mg Daily To His Glucotrol 10 Mg Bid; He Has Had Full ADA Training With Bree Shepard    i Pulmonary Interstitial Fibrosis On 03/2015 Chest CT     j Asymptomatic cholelithiasis     On 03/2015 Chest CT     j Colon CA S/P Partial Colectomy 2008     Dr. Hayes benavidez Diverticulosis     Dr. Hayes martinez BPH     Dr. Norris Florian; On Cialis 5 Mg Daily For This    k Elevated PSA Levels     Dr. Norris Florian; I Always Send Lab Results To Dr. Norris art GEORGES positive     Dr. Adeola art Chronic left shoulder pain     Dr. Heike art H/O Left TKR 02/2013     Dr. Heike art Lumbar Spinal DDD     I Will Refer Him To Neurosurgery    l Thoracic Spinal DDD     On 03/2015 Chest CT     n Situational Anxiety And Depression     RTC 6 Weeks; RXd Wellbutrin  Mg qAM; Lexapro Also Worked Well    q Chronic Eczema     Dr. Adeola Sarkar; Dr. Erna winkler Recurrent Urticaria Vasculitis     Dr. Adeola winkler Toenail Onychomycosis (07/2015)     RXd Lamisil 7/17/15    q Vitamin D deficiency     12/5/17 RXd OTC D3 2K IU Daily     Wellness Visit 5/24/2017      Family History   Problem Relation Age of Onset    Cancer Mother     Heart disease Father     Hyperlipidemia Father     Hypertension Father      Social History   Substance Use Topics    Smoking status: Former Smoker    Smokeless tobacco: Not on file    Alcohol use Not on file         Current Outpatient Prescriptions on File Prior to Visit   Medication Sig Dispense Refill    amLODIPine (NORVASC) 2.5 MG tablet Take 2.5 mg by mouth every morning.  3    aspirin (ECOTRIN) 81 MG EC tablet Take 1 tablet by mouth once daily.      blood sugar diagnostic (BLOOD GLUCOSE TEST) Strp MARKIE MICROLET LANCETS MISC      blood sugar diagnostic Strp 1 strip by Misc.(Non-Drug; Combo Route) route before breakfast. Diagnosis code E11.9 100 strip 3    blood-glucose meter (BLOOD GLUCOSE MONITORING) kit Use as instructed 1 each 0    cetirizine (ZYRTEC) 10 MG tablet Take 10 mg by mouth every morning.  1    cholecalciferol, vitamin D3, 5,000 unit capsule Take 1 capsule by mouth once daily.      CIALIS 5 mg tablet Take 5 mg by mouth once daily.  99    clopidogrel (PLAVIX) 75 mg tablet Take 1 tablet by mouth once daily.      CRESTOR 20 mg tablet Take 1 tablet by mouth once daily.      dulaglutide 0.75 mg/0.5 mL PnIj Inject 0.5 mLs (0.75 mg total) into the skin once a week. 12 Syringe 3    famotidine (PEPCID) 20 MG tablet Take 20 mg by mouth 2 (two) times daily.  6    glimepiride (AMARYL) 4 MG tablet Take 1 tablet (4 mg total) by mouth daily with breakfast. 90 tablet 3    hydrochlorothiazide (HYDRODIURIL) 25 MG tablet Take 1 tablet by mouth once daily.      hydroxychloroquine (PLAQUENIL) 200 mg tablet Take 1 tablet (200 mg total) by mouth 2 (two) times daily. 180 tablet 3    hydrOXYzine HCl (ATARAX) 25 MG tablet Take 25 mg by mouth every evening.  1    JANUVIA 50 mg Tab TAKE 1 TABLET (50 MG TOTAL) BY MOUTH ONCE DAILY. 90 tablet 1    lancets Misc 1 lancet by Misc.(Non-Drug; Combo Route) route  before breakfast. Diagnosis code E11.9 100 each 3    metoprolol succinate (TOPROL-XL) 25 MG 24 hr tablet Take 1 tablet (25 mg total) by mouth every morning. 90 tablet 3    omega-3 acid ethyl esters (LOVAZA) 1 gram capsule Take 2 capsules by mouth 2 (two) times daily.      ramipril (ALTACE) 10 MG capsule Take 10 mg by mouth 2 (two) times daily.  2    tadalafil (CIALIS) 20 MG Tab Take 1 tablet (20 mg total) by mouth once daily. 88 tablet 3    vitamin A-vitamin C-vit E-min Tab OCUVITE EYE HEALTH FORMULA CAPS      fluocinolone (SYNALAR) 0.01 % Sham Apply topically once daily. 120 mL 1    predniSONE (DELTASONE) 5 MG tablet 2 tabs daily for 10 days , then 1 tab daily for 20 days 40 tablet 3    [DISCONTINUED] clotrimazole-betamethasone 1-0.05% (LOTRISONE) cream Apply topically 2 (two) times daily. Apply topically to affected area bid 45 g 0     No current facility-administered medications on file prior to visit.        Vitals:    03/07/18 1202   BP: (!) 144/82   Pulse: 61       Physical Exam:    Physical Exam   Constitutional: He is oriented to person, place, and time. He appears well-developed and well-nourished.   HENT:   Head: Normocephalic.   Right Ear: Hearing normal.   Left Ear: Hearing normal.   Nose: No rhinorrhea.   Mouth/Throat: Uvula is midline, oropharynx is clear and moist and mucous membranes are normal.   Eyes: Conjunctivae and EOM are normal. Pupils are equal, round, and reactive to light.   Cardiovascular: Normal rate, regular rhythm and normal heart sounds.    Pulmonary/Chest: Effort normal and breath sounds normal.   Musculoskeletal:        Right shoulder: He exhibits normal range of motion, no tenderness and no swelling.        Left shoulder: He exhibits normal range of motion, no tenderness and no swelling.        Right wrist: He exhibits normal range of motion, no tenderness and no swelling.        Left wrist: He exhibits normal range of motion, no tenderness and no swelling.        Right  knee: He exhibits normal range of motion and no swelling. No tenderness found.        Left knee: He exhibits normal range of motion and no swelling. No tenderness found.        Right ankle: He exhibits normal range of motion and no swelling. No tenderness.        Left ankle: He exhibits normal range of motion and no swelling. No tenderness.        Right hand: He exhibits normal range of motion, no tenderness and no swelling.        Left hand: He exhibits normal range of motion, no tenderness and no swelling.        Right foot: There is normal range of motion, no tenderness and no swelling.        Left foot: There is normal range of motion, no tenderness and no swelling.   Neurological: He is oriented to person, place, and time. He has normal strength.   Skin: Skin is warm, dry and intact.   No uriticarial rash of skin  Markedly improved skin and scalp.    Psychiatric: He has a normal mood and affect. His speech is normal and behavior is normal. Cognition and memory are normal.                 Assessment:       Encounter Diagnoses   Name Primary?    Urticaria     Positive GEORGES (antinuclear antibody)           Plan:        Urticaria    Positive GEOGRES (antinuclear antibody)         Skin markedlyimproved   biopsy consistent with urticaria hypersensitivity in setting of +GEORGES and +RNP.   No clinical evidence for SLE/no patho to suggest cutaneous lupus.    -Cannot ascertain any specific medication or exposure to attribute to hypersensitivity.    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    -   -discussed with patient that unclear etiology of rash   -Follows annually with Dr. Washington will need to add HCQ eye exam.        Follow-up in about 4 months (around 7/7/2018).        30min consultation with greater than 50% spent in counseling, chart review and coordination of care. All questions answered.

## 2018-07-18 ENCOUNTER — OFFICE VISIT (OUTPATIENT)
Dept: RHEUMATOLOGY | Facility: CLINIC | Age: 78
End: 2018-07-18
Payer: MEDICARE

## 2018-07-18 VITALS
SYSTOLIC BLOOD PRESSURE: 134 MMHG | HEART RATE: 60 BPM | WEIGHT: 194.31 LBS | HEIGHT: 71 IN | DIASTOLIC BLOOD PRESSURE: 80 MMHG | BODY MASS INDEX: 27.2 KG/M2

## 2018-07-18 DIAGNOSIS — L50.9 URTICARIA: ICD-10-CM

## 2018-07-18 DIAGNOSIS — R76.8 POSITIVE ANA (ANTINUCLEAR ANTIBODY): ICD-10-CM

## 2018-07-18 DIAGNOSIS — R76.8 ANA POSITIVE: Primary | ICD-10-CM

## 2018-07-18 PROCEDURE — 99213 OFFICE O/P EST LOW 20 MIN: CPT | Mod: PBBFAC,PO | Performed by: INTERNAL MEDICINE

## 2018-07-18 PROCEDURE — 99999 PR PBB SHADOW E&M-EST. PATIENT-LVL III: CPT | Mod: PBBFAC,,, | Performed by: INTERNAL MEDICINE

## 2018-07-18 PROCEDURE — 99214 OFFICE O/P EST MOD 30 MIN: CPT | Mod: S$PBB,,, | Performed by: INTERNAL MEDICINE

## 2018-07-18 RX ORDER — HYDROXYCHLOROQUINE SULFATE 200 MG/1
200 TABLET, FILM COATED ORAL DAILY
Qty: 30 TABLET | Refills: 11 | Status: SHIPPED | OUTPATIENT
Start: 2018-07-18 | End: 2019-08-04 | Stop reason: SDUPTHER

## 2019-01-21 ENCOUNTER — TELEPHONE (OUTPATIENT)
Dept: RHEUMATOLOGY | Facility: CLINIC | Age: 79
End: 2019-01-21

## 2019-01-21 NOTE — TELEPHONE ENCOUNTER
----- Message from Radha Manuel sent at 1/21/2019  8:54 AM CST -----  Contact: self  Type:  Patient Returning Call    Who Called:  self  Who Left Message for Patient:  Estelle  Does the patient know what this is regarding?:  yes  Best Call Back Number:  [phone]  Additional Information:  Estelle called patient to reschedule appointment. He will take the appointment on 02/01/19 at 10:30. Please call patient to confirm it has been changed. Thanks!

## 2019-03-07 ENCOUNTER — OFFICE VISIT (OUTPATIENT)
Dept: RHEUMATOLOGY | Facility: CLINIC | Age: 79
End: 2019-03-07
Payer: MEDICARE

## 2019-03-07 VITALS
HEART RATE: 63 BPM | WEIGHT: 191 LBS | HEIGHT: 71 IN | BODY MASS INDEX: 26.74 KG/M2 | SYSTOLIC BLOOD PRESSURE: 117 MMHG | DIASTOLIC BLOOD PRESSURE: 76 MMHG

## 2019-03-07 DIAGNOSIS — M65.9 TENOSYNOVITIS OF BOTH HANDS: ICD-10-CM

## 2019-03-07 DIAGNOSIS — R76.8 ANA POSITIVE: ICD-10-CM

## 2019-03-07 DIAGNOSIS — L50.9 URTICARIA: Primary | ICD-10-CM

## 2019-03-07 DIAGNOSIS — N18.30 STAGE III CHRONIC KIDNEY DISEASE: ICD-10-CM

## 2019-03-07 DIAGNOSIS — M18.0 OSTEOARTHRITIS OF BOTH THUMBS: ICD-10-CM

## 2019-03-07 PROCEDURE — 99214 PR OFFICE/OUTPT VISIT, EST, LEVL IV, 30-39 MIN: ICD-10-PCS | Mod: S$PBB,,, | Performed by: INTERNAL MEDICINE

## 2019-03-07 PROCEDURE — 99999 PR PBB SHADOW E&M-EST. PATIENT-LVL III: ICD-10-PCS | Mod: PBBFAC,,, | Performed by: INTERNAL MEDICINE

## 2019-03-07 PROCEDURE — 99214 OFFICE O/P EST MOD 30 MIN: CPT | Mod: S$PBB,,, | Performed by: INTERNAL MEDICINE

## 2019-03-07 PROCEDURE — 99213 OFFICE O/P EST LOW 20 MIN: CPT | Mod: PBBFAC,PO | Performed by: INTERNAL MEDICINE

## 2019-03-07 PROCEDURE — 99999 PR PBB SHADOW E&M-EST. PATIENT-LVL III: CPT | Mod: PBBFAC,,, | Performed by: INTERNAL MEDICINE

## 2019-03-07 RX ORDER — TERBINAFINE HYDROCHLORIDE 250 MG/1
TABLET ORAL
Refills: 2 | COMMUNITY
Start: 2019-02-24 | End: 2020-03-05

## 2019-03-07 NOTE — PROGRESS NOTES
Subjective:          Chief Complaint: Kuldip Espinal is a 78 y.o. male who had concerns including Positive GEORGES (6 months) and Urticaria.    HPI:      Very pleasant 76y/o gentleman referred for + GEORGES 1:1280 (ho) and idopathic urticaria. Treated with HCQ    No ASE. Labs for ANCA negative. ESR WNL, CRP 18.8.       Hx of eczema which I think is what is remaining at this time.   Did have biopsy with Dr. Ayn Vitale/Dr. Clint Gastelum did biopsy (urticaria) on right arm. - questioned any drug to attribute this to. But stopped meds and not difference.   Dr. Rogel-Immunogy/ seen twice considering Xolair  He has been on topical steroids. Steroids oral did help flares when comes off    Interval events: patient with acute joint swelling, right dorsum hand and then some swelling left hand. He was treated initially with ABX then with Medrol and after few weeks resolved.   Pending some blood work for gout vs OA. Imaging more suggestive of flare of OA. Previous serologies with me were negative for RA but given his hx agree with Dr. Mcpherson to check for this.   Patient did have scratch from dog but cannot explain the left hand which occurred days later.     No weakness, no fevers. Some chills. No SOB, no cough,   Irritation around his eyes itching and heavy but no rahs.     GEORGES 1-1280 homogeneous pattern with a negative GEORGES profile. He does note he has hx of eczema that is pruritic, typically on arms with scaling but denies erythema and severe scale. Rash seems to be exacerbated with stress, not sunlight.  Patient denies weight loss, rashes, dry eye, dry mouth, nasal or palatal ulcerations,  lymphadenopathy, Raynaud's, hx of DVT/miscarriages, psoriasis or family hx of psoriasis, rashes, serositis, anemia or other constitutional symptoms.    Component      Latest Ref Rng & Units 1/26/2016   Anti Sm/RNP Antibody      0.00 - 19.99 EU 0.46   Anti-Sm/RNP Interpretation      Negative Negative   Anti Sm Antibody      0.00 -  19.99 EU 0.58   Anti-Sm Interpretation      Negative Negative   Anti-SSB Antibody      0.00 - 19.99 EU 0.18   Anti-SSB Interpretation      Negative Negative   Anti-SSA Antibody      0.00 - 19.99 EU 0.47   Anti-SSA Interpretation      Negative Negative   ds DNA Ab      Negative 1:10 Negative 1:10   Anti-Histone Antibody      0.0 - 0.9 Units 0.2   Complement (C-3)      50 - 180 mg/dL 151   Complement (C-4)      11 - 44 mg/dL 29   Complement,Total, Serum      54 - 144  129   Rheumatoid Factor      0.0 - 15.0 IU/mL <10.0   GEORGES HEP-2 Titer       Positive 1:1280 Homogeneous         REVIEW OF SYSTEMS:    Review of Systems   Constitutional: Negative for fever, malaise/fatigue and weight loss.   HENT: Negative for sore throat.    Eyes: Negative for double vision, photophobia and redness.   Respiratory: Negative for cough, shortness of breath and wheezing.    Cardiovascular: Negative for chest pain, palpitations and orthopnea.   Gastrointestinal: Negative for abdominal pain, constipation and diarrhea.   Genitourinary: Negative for dysuria, hematuria and urgency.   Musculoskeletal: Negative for back pain, joint pain and myalgias.   Skin: Positive for itching and rash.   Neurological: Negative for dizziness, tingling, focal weakness and headaches.   Endo/Heme/Allergies: Does not bruise/bleed easily.   Psychiatric/Behavioral: Negative for depression, hallucinations and suicidal ideas.               Objective:              Family History   Problem Relation Age of Onset    Cancer Mother     Heart disease Father     Hyperlipidemia Father     Hypertension Father      Social History     Tobacco Use    Smoking status: Former Smoker   Substance Use Topics    Alcohol use: Not on file    Drug use: Not on file         Current Outpatient Medications on File Prior to Visit   Medication Sig Dispense Refill    amLODIPine (NORVASC) 2.5 MG tablet Take 2.5 mg by mouth every morning.  3    aspirin (ECOTRIN) 81 MG EC tablet Take 1  tablet by mouth once daily.      blood sugar diagnostic (BLOOD GLUCOSE TEST) Strp MARKIE MICROLET LANCETS MISC      blood sugar diagnostic Strp 1 strip by Misc.(Non-Drug; Combo Route) route before breakfast. Diagnosis code E11.9 100 strip 3    blood-glucose meter (BLOOD GLUCOSE MONITORING) kit Use as instructed 1 each 0    cetirizine (ZYRTEC) 10 MG tablet Take 10 mg by mouth every morning.  1    cholecalciferol, vitamin D3, 10,000 unit Tab Take 10,000 Units by mouth once daily.      CIALIS 5 mg tablet Take 5 mg by mouth once daily.  99    clopidogrel (PLAVIX) 75 mg tablet Take 1 tablet by mouth once daily.      CRESTOR 20 mg tablet Take 1 tablet by mouth once daily.      dutasteride (AVODART) 0.5 mg capsule TAKE 1 CAPSULE DAILY FOR 90 DAYS  3    hydrochlorothiazide (HYDRODIURIL) 25 MG tablet Take 1 tablet by mouth once daily.      hydroxychloroquine (PLAQUENIL) 200 mg tablet Take 1 tablet (200 mg total) by mouth once daily. 30 tablet 11    hydrOXYzine HCl (ATARAX) 25 MG tablet Take 25 mg by mouth every evening.  1    JANUVIA 50 mg Tab TAKE 1 TABLET (50 MG TOTAL) BY MOUTH ONCE DAILY. 90 tablet 1    lancets 30 gauge Misc USE1 LANCET BEFORE BREAKFAST.  3    lancets Misc 1 lancet by Misc.(Non-Drug; Combo Route) route before breakfast. Diagnosis code E11.9 100 each 3    metoprolol succinate (TOPROL-XL) 25 MG 24 hr tablet TAKE 1 TABLET EVERY MORNING 90 tablet 1    multivitamin (ONE DAILY MULTIVITAMIN) per tablet Take 1 tablet by mouth once daily.      omega-3 acid ethyl esters (LOVAZA) 1 gram capsule Take 2 capsules by mouth 2 (two) times daily.      ramipril (ALTACE) 10 MG capsule Take 10 mg by mouth 2 (two) times daily.  2    terbinafine HCl (LAMISIL) 250 mg tablet TAKE 1 TABLET (250 MG TOTAL) BY MOUTH ONCE DAILY.  2    TRULICITY 0.75 mg/0.5 mL PnIj INJECT 0.5 MLS (0.75 MG TOTAL) INTO THE SKIN ONCE A WEEK. 6 Syringe 1    vitamin A-vitamin C-vit E-min Tab OCUVITE EYE HEALTH FORMULA CAPS       fluocinolone (SYNALAR) 0.01 % Sham Apply topically once daily. 120 mL 1    [DISCONTINUED] methylPREDNISolone (MEDROL DOSEPACK) 4 mg tablet use as directed 1 Package 0     No current facility-administered medications on file prior to visit.        Vitals:    03/07/19 1023   BP: 117/76   Pulse: 63       Physical Exam:    Physical Exam   Constitutional: He is oriented to person, place, and time. He appears well-developed and well-nourished.   HENT:   Head: Normocephalic.   Right Ear: Hearing normal.   Left Ear: Hearing normal.   Nose: No rhinorrhea.   Mouth/Throat: Uvula is midline, oropharynx is clear and moist and mucous membranes are normal.   Eyes: Conjunctivae and EOM are normal. Pupils are equal, round, and reactive to light.   Cardiovascular: Normal rate, regular rhythm and normal heart sounds.   Pulmonary/Chest: Effort normal and breath sounds normal.   Musculoskeletal:        Right shoulder: He exhibits normal range of motion, no tenderness and no swelling.        Left shoulder: He exhibits normal range of motion, no tenderness and no swelling.        Right wrist: He exhibits normal range of motion, no tenderness and no swelling.        Left wrist: He exhibits normal range of motion, no tenderness and no swelling.        Right knee: He exhibits normal range of motion and no swelling. No tenderness found.        Left knee: He exhibits normal range of motion and no swelling. No tenderness found.        Right ankle: He exhibits normal range of motion and no swelling. No tenderness.        Left ankle: He exhibits normal range of motion and no swelling. No tenderness.        Right hand: He exhibits normal range of motion, no tenderness and no swelling.        Left hand: He exhibits normal range of motion, no tenderness and no swelling.        Right foot: There is normal range of motion, no tenderness and no swelling.        Left foot: There is normal range of motion, no tenderness and no swelling.   Neurological:  He is oriented to person, place, and time. He has normal strength.   Skin: Skin is warm, dry and intact.   No uriticarial rash of skin  Markedly improved skin and scalp.    Psychiatric: He has a normal mood and affect. His speech is normal and behavior is normal. Cognition and memory are normal.                 Assessment:       Encounter Diagnoses   Name Primary?    Recurrent Urticaria Vasculitis Yes    Stage 3 CKD     GEORGES positive     Bilateral Thumb MCP Joint Osteoarthritis     Tenosynovitis of both hands           Plan:        Recurrent Urticaria Vasculitis    Stage 3 CKD    GEORGES positive    Bilateral Thumb MCP Joint Osteoarthritis    Tenosynovitis of both hands  Comments:  recent flare dorsum right hand and into the CMC joints. pending labs with Dr. Mcpherson.          Skin markedlyimproved   biopsy consistent with urticaria hypersensitivity in setting of +GEORGES and +RNP.   Right axilla with new urticarial like rash not pruritic. Use topical steroids for this.   Continue HCQ for now.   No clinical evidence for SLE/no patho to suggest cutaneous lupus.    -Cannot ascertain any specific medication or exposure to attribute to hypersensitivity.    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      -discussed with patient that unclear etiology of rash   -Follows annually with Dr. Washington -HCQ eye exam.      Pending some blood work for gout vs OA. Imaging more suggestive of flare of OA. Previous serologies with me were negative for RA but given his hx agree with Dr. Mcpherson to check for this.   Patient did have scratch from dog but cannot explain the left hand which occurred days later.       Follow-up in about 6 months (around 9/7/2019).        30min consultation with greater than 50% spent in counseling, chart review and coordination of care. All questions answered.

## 2019-03-07 NOTE — LETTER
March 7, 2019        Behzad Hills MD  [address]  [facility] [address]             [facility]  [address] [facility] [address]  Phone: [phone]  Fax: [phone]   Patient: Kuldip Espinal   MR Number: [id number]   YOB: 1940   Date of Visit: 3/7/2019       Dear Dr. Hills:    Follwing Mr. Espinal for Idiopathic Urticarial Vasculitis. Managed on [place].  Attached you will find relevant portions of my assessment and plan of care.    If you have questions, please do not hesitate to call me. I look forward to following Kuldip Espinal along with you.    Sincerely,      Adeola Sarkar, DO            CC  No Recipients    Enclosure

## 2019-08-04 DIAGNOSIS — R76.8 POSITIVE ANA (ANTINUCLEAR ANTIBODY): ICD-10-CM

## 2019-08-04 DIAGNOSIS — L50.9 URTICARIA: ICD-10-CM

## 2019-08-08 RX ORDER — HYDROXYCHLOROQUINE SULFATE 200 MG/1
200 TABLET, FILM COATED ORAL DAILY
Qty: 90 TABLET | Refills: 3 | Status: SHIPPED | OUTPATIENT
Start: 2019-08-08 | End: 2020-08-03 | Stop reason: SDUPTHER

## 2019-09-09 ENCOUNTER — OFFICE VISIT (OUTPATIENT)
Dept: RHEUMATOLOGY | Facility: CLINIC | Age: 79
End: 2019-09-09
Payer: MEDICARE

## 2019-09-09 VITALS
DIASTOLIC BLOOD PRESSURE: 78 MMHG | WEIGHT: 193.56 LBS | HEIGHT: 71 IN | HEART RATE: 65 BPM | BODY MASS INDEX: 27.1 KG/M2 | SYSTOLIC BLOOD PRESSURE: 124 MMHG

## 2019-09-09 DIAGNOSIS — R76.8 ANA POSITIVE: Primary | ICD-10-CM

## 2019-09-09 DIAGNOSIS — L50.9 URTICARIA: ICD-10-CM

## 2019-09-09 PROCEDURE — 99999 PR PBB SHADOW E&M-EST. PATIENT-LVL III: ICD-10-PCS | Mod: PBBFAC,,, | Performed by: INTERNAL MEDICINE

## 2019-09-09 PROCEDURE — 99214 PR OFFICE/OUTPT VISIT, EST, LEVL IV, 30-39 MIN: ICD-10-PCS | Mod: S$PBB,,, | Performed by: INTERNAL MEDICINE

## 2019-09-09 PROCEDURE — 99214 OFFICE O/P EST MOD 30 MIN: CPT | Mod: S$PBB,,, | Performed by: INTERNAL MEDICINE

## 2019-09-09 PROCEDURE — 99999 PR PBB SHADOW E&M-EST. PATIENT-LVL III: CPT | Mod: PBBFAC,,, | Performed by: INTERNAL MEDICINE

## 2019-09-09 PROCEDURE — 99213 OFFICE O/P EST LOW 20 MIN: CPT | Mod: PBBFAC,PO | Performed by: INTERNAL MEDICINE

## 2019-09-09 ASSESSMENT — ROUTINE ASSESSMENT OF PATIENT INDEX DATA (RAPID3)
PAIN SCORE: 6
MDHAQ FUNCTION SCORE: 0
PATIENT GLOBAL ASSESSMENT SCORE: 2
PSYCHOLOGICAL DISTRESS SCORE: 0
TOTAL RAPID3 SCORE: 2.67

## 2019-09-09 NOTE — PROGRESS NOTES
Subjective:          Chief Complaint: Kuldip Espinal is a 78 y.o. male who had concerns including 6 month follow up.    HPI:      Very pleasant 76y/o gentleman referred for + GEORGES 1:1280 (ho) and idopathic urticaria. Treated with HCQ    No ASE. Labs for ANCA negative. ESR WNL,  3 weeks ago with increase pruritis and faint rash medial UE and left ABD. Has not had this in many months. 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   He has no vasculitic lesion.     Hx of eczema which I think is what is remaining at this time.   Did have biopsy with Dr. Any Vitale/Dr. Jaramillo - he does have triamcinolone and cetaphil which he is not using as often. Reduces duration of shower/mild heat.   Dr. Gastelum did biopsy (urticaria) on right arm. - questioned any drug to attribute this to. But stopped meds and not difference.   Dr. Rogel-Immunogy/ seen twice considering Xolair not seen in a few years.   Asking if he should have another visit with Immunology.   He has been on topical steroids. Steroids oral did help flares when comes off    Interval events: patient with acute joint swelling, right dorsum hand and then some swelling left hand. He was treated initially with ABX then with Medrol and after few weeks resolved.   Pending some blood work for gout vs OA. Imaging more suggestive of flare of OA. Previous serologies with me were negative for RA but given his hx agree with Dr. Mcpherson to check for this.   Patient did have scratch from dog but cannot explain the left hand which occurred days later.     No weakness, no fevers. Some chills. No SOB, no cough,   Irritation around his eyes itching and heavy but no rahs.     GEORGES 1-1280 homogeneous pattern with a negative GEORGES profile. He does note he has hx of eczema that is pruritic, typically on arms with scaling but denies erythema and severe scale. Rash seems to be exacerbated with stress, not sunlight.  Patient denies weight loss,  rashes, dry eye, dry mouth, nasal or palatal ulcerations,  lymphadenopathy, Raynaud's, hx of DVT/miscarriages, psoriasis or family hx of psoriasis, rashes, serositis, anemia or other constitutional symptoms.    Component      Latest Ref Rng & Units 2016   Anti Sm/RNP Antibody      0.00 - 19.99 EU 0.46   Anti-Sm/RNP Interpretation      Negative Negative   Anti Sm Antibody      0.00 - 19.99 EU 0.58   Anti-Sm Interpretation      Negative Negative   Anti-SSB Antibody      0.00 - 19.99 EU 0.18   Anti-SSB Interpretation      Negative Negative   Anti-SSA Antibody      0.00 - 19.99 EU 0.47   Anti-SSA Interpretation      Negative Negative   ds DNA Ab      Negative 1:10 Negative 1:10   Anti-Histone Antibody      0.0 - 0.9 Units 0.2   Complement (C-3)      50 - 180 mg/dL 151   Complement (C-4)      11 - 44 mg/dL 29   Complement,Total, Serum      54 - 144  129   Rheumatoid Factor      0.0 - 15.0 IU/mL <10.0   GEORGES HEP-2 Titer       Positive 1:1280 Homogeneous         REVIEW OF SYSTEMS:    Review of Systems   Constitutional: Negative for fever, malaise/fatigue and weight loss.   HENT: Negative for sore throat.    Eyes: Negative for double vision, photophobia and redness.   Respiratory: Negative for cough, shortness of breath and wheezing.    Cardiovascular: Negative for chest pain, palpitations and orthopnea.   Gastrointestinal: Negative for abdominal pain, constipation and diarrhea.   Genitourinary: Negative for dysuria, hematuria and urgency.   Musculoskeletal: Negative for back pain, joint pain and myalgias.   Skin: Positive for itching and rash.   Neurological: Negative for dizziness, tingling, focal weakness and headaches.   Endo/Heme/Allergies: Does not bruise/bleed easily.   Psychiatric/Behavioral: Negative for depression, hallucinations and suicidal ideas.               Objective:              Family History   Problem Relation Age of Onset    Cancer Mother     Heart disease Father     Hyperlipidemia Father      Hypertension Father      Social History     Tobacco Use    Smoking status: Former Smoker   Substance Use Topics    Alcohol use: Not on file    Drug use: Not on file         Current Outpatient Medications on File Prior to Visit   Medication Sig Dispense Refill    amLODIPine (NORVASC) 2.5 MG tablet Take 2.5 mg by mouth every morning.  3    aspirin (ECOTRIN) 81 MG EC tablet Take 1 tablet by mouth once daily.      blood sugar diagnostic (BLOOD GLUCOSE TEST) Strp MARKIE MICROLET LANCETS MISC      blood sugar diagnostic Strp 1 strip by Misc.(Non-Drug; Combo Route) route before breakfast. Diagnosis code E11.9 100 strip 3    blood-glucose meter (BLOOD GLUCOSE MONITORING) kit Use as instructed 1 each 0    cetirizine (ZYRTEC) 10 MG tablet Take 10 mg by mouth every morning.  1    cholecalciferol, vitamin D3, 10,000 unit Tab Take 10,000 Units by mouth once daily.      CIALIS 5 mg tablet Take 5 mg by mouth once daily.  99    clopidogrel (PLAVIX) 75 mg tablet Take 1 tablet by mouth once daily.      CRESTOR 20 mg tablet Take 1 tablet by mouth once daily.      dulaglutide (TRULICITY) 0.75 mg/0.5 mL PnIj INJECT 0.5 MLS (0.75 MG TOTAL) INTO THE SKIN ONCE A WEEK. 6 Syringe 6    dutasteride (AVODART) 0.5 mg capsule TAKE 1 CAPSULE DAILY FOR 90 DAYS  3    hydrochlorothiazide (HYDRODIURIL) 25 MG tablet Take 1 tablet by mouth once daily.      hydroxychloroquine (PLAQUENIL) 200 mg tablet TAKE 1 TABLET (200 MG TOTAL) BY MOUTH ONCE DAILY. 90 tablet 3    hydrOXYzine HCl (ATARAX) 25 MG tablet Take 25 mg by mouth every evening.  1    JANUVIA 50 mg Tab TAKE 1 TABLET (50 MG TOTAL) BY MOUTH ONCE DAILY. 90 tablet 1    lancets 30 gauge Misc USE1 LANCET BEFORE BREAKFAST.  3    lancets Misc 1 lancet by Misc.(Non-Drug; Combo Route) route before breakfast. Diagnosis code E11.9 100 each 3    methylPREDNISolone (MEDROL DOSEPACK) 4 mg tablet use as directed 1 Package 0    metoprolol succinate (TOPROL-XL) 25 MG 24 hr tablet TAKE 1  TABLET EVERY MORNING 90 tablet 1    multivitamin (ONE DAILY MULTIVITAMIN) per tablet Take 1 tablet by mouth once daily.      omega-3 acid ethyl esters (LOVAZA) 1 gram capsule Take 2 capsules by mouth 2 (two) times daily.      ramipril (ALTACE) 10 MG capsule Take 10 mg by mouth 2 (two) times daily.  2    terbinafine HCl (LAMISIL) 250 mg tablet TAKE 1 TABLET (250 MG TOTAL) BY MOUTH ONCE DAILY.  2    vitamin A-vitamin C-vit E-min Tab OCUVITE EYE HEALTH FORMULA CAPS      fluocinolone (SYNALAR) 0.01 % Sham Apply topically once daily. 120 mL 1     No current facility-administered medications on file prior to visit.        Vitals:    09/09/19 1103   BP: 124/78   Pulse: 65       Physical Exam:    Physical Exam   Constitutional: He is oriented to person, place, and time. He appears well-developed and well-nourished.   HENT:   Head: Normocephalic.   Right Ear: Hearing normal.   Left Ear: Hearing normal.   Nose: No rhinorrhea.   Mouth/Throat: Uvula is midline, oropharynx is clear and moist and mucous membranes are normal.   Eyes: Pupils are equal, round, and reactive to light. Conjunctivae and EOM are normal.   Cardiovascular: Normal rate, regular rhythm and normal heart sounds.   Pulmonary/Chest: Effort normal and breath sounds normal.   Musculoskeletal:        Right shoulder: He exhibits normal range of motion, no tenderness and no swelling.        Left shoulder: He exhibits normal range of motion, no tenderness and no swelling.        Right wrist: He exhibits normal range of motion, no tenderness and no swelling.        Left wrist: He exhibits normal range of motion, no tenderness and no swelling.        Right knee: He exhibits normal range of motion and no swelling. No tenderness found.        Left knee: He exhibits normal range of motion and no swelling. No tenderness found.        Right ankle: He exhibits normal range of motion and no swelling. No tenderness.        Left ankle: He exhibits normal range of motion  and no swelling. No tenderness.        Right hand: He exhibits normal range of motion, no tenderness and no swelling.        Left hand: He exhibits normal range of motion, no tenderness and no swelling.        Right foot: There is normal range of motion, no tenderness and no swelling.        Left foot: There is normal range of motion, no tenderness and no swelling.   Neurological: He is oriented to person, place, and time. He has normal strength.   Skin: Skin is warm, dry and intact.   No uriticarial rash of skin  Markedly improved skin and scalp.    Psychiatric: He has a normal mood and affect. His speech is normal and behavior is normal. Cognition and memory are normal.                 Assessment:       Encounter Diagnoses   Name Primary?    GEORGES positive Yes    Recurrent Urticaria Vasculitis           Plan:        GEORGES positive    Recurrent Urticaria Vasculitis  Comments:  has resolved with HCQ for few years now having some return in arms and chest.          Skin markedlyimproved   biopsy consistent with urticaria hypersensitivity in setting of +GEORGES and +RNP.   Right axilla with new urticarial like rash not pruritic. Use topical steroids for this.   Continue HCQ for now.   No clinical evidence for SLE/no patho to suggest cutaneous lupus.    -Cannot ascertain any specific medication or exposure to attribute to hypersensitivity.    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      -discussed with patient that unclear etiology of rash some eczema/ and Idiopathic urticaria. Thus far did respond with HCQ, now x 3 weeks having some breakthrough.   Reasonable to meet with Dr. Moreno.    -Follows annually with Dr. Washington -HCQ eye exam.       Follow up in about 5 months (around 2/9/2020).        30min consultation with greater than 50% spent in counseling, chart review and coordination of care. All questions answered.

## 2020-03-30 ENCOUNTER — TELEPHONE (OUTPATIENT)
Dept: RHEUMATOLOGY | Facility: CLINIC | Age: 80
End: 2020-03-30

## 2020-08-03 DIAGNOSIS — L50.9 URTICARIA: ICD-10-CM

## 2020-08-03 DIAGNOSIS — R76.8 POSITIVE ANA (ANTINUCLEAR ANTIBODY): ICD-10-CM

## 2020-08-03 RX ORDER — HYDROXYCHLOROQUINE SULFATE 200 MG/1
200 TABLET, FILM COATED ORAL DAILY
Qty: 90 TABLET | Refills: 3 | Status: SHIPPED | OUTPATIENT
Start: 2020-08-03 | End: 2021-04-30 | Stop reason: SDUPTHER

## 2020-08-13 ENCOUNTER — OFFICE VISIT (OUTPATIENT)
Dept: RHEUMATOLOGY | Facility: CLINIC | Age: 80
End: 2020-08-13
Payer: MEDICARE

## 2020-08-13 VITALS
SYSTOLIC BLOOD PRESSURE: 133 MMHG | BODY MASS INDEX: 26.45 KG/M2 | WEIGHT: 188.94 LBS | HEIGHT: 71 IN | DIASTOLIC BLOOD PRESSURE: 74 MMHG | HEART RATE: 67 BPM

## 2020-08-13 DIAGNOSIS — R76.8 ANA POSITIVE: Primary | ICD-10-CM

## 2020-08-13 DIAGNOSIS — L50.1 CHRONIC IDIOPATHIC URTICARIA: ICD-10-CM

## 2020-08-13 DIAGNOSIS — Z79.899 LONG-TERM USE OF PLAQUENIL: ICD-10-CM

## 2020-08-13 DIAGNOSIS — R06.02 SOB (SHORTNESS OF BREATH): ICD-10-CM

## 2020-08-13 PROCEDURE — 99214 OFFICE O/P EST MOD 30 MIN: CPT | Mod: S$PBB,,, | Performed by: INTERNAL MEDICINE

## 2020-08-13 PROCEDURE — 99214 PR OFFICE/OUTPT VISIT, EST, LEVL IV, 30-39 MIN: ICD-10-PCS | Mod: S$PBB,,, | Performed by: INTERNAL MEDICINE

## 2020-08-13 PROCEDURE — 99999 PR PBB SHADOW E&M-EST. PATIENT-LVL V: CPT | Mod: PBBFAC,,, | Performed by: INTERNAL MEDICINE

## 2020-08-13 PROCEDURE — 99999 PR PBB SHADOW E&M-EST. PATIENT-LVL V: ICD-10-PCS | Mod: PBBFAC,,, | Performed by: INTERNAL MEDICINE

## 2020-08-13 PROCEDURE — 99215 OFFICE O/P EST HI 40 MIN: CPT | Mod: PBBFAC,PO | Performed by: INTERNAL MEDICINE

## 2020-08-13 ASSESSMENT — ROUTINE ASSESSMENT OF PATIENT INDEX DATA (RAPID3)
PSYCHOLOGICAL DISTRESS SCORE: 0
TOTAL RAPID3 SCORE: 0.33
PAIN SCORE: 0
PATIENT GLOBAL ASSESSMENT SCORE: 0
MDHAQ FUNCTION SCORE: 0.3

## 2020-08-14 PROBLEM — I49.3 PVC'S (PREMATURE VENTRICULAR CONTRACTIONS): Status: ACTIVE | Noted: 2020-08-14

## 2021-01-04 ENCOUNTER — CLINICAL SUPPORT (OUTPATIENT)
Dept: URGENT CARE | Facility: CLINIC | Age: 81
End: 2021-01-04
Payer: MEDICARE

## 2021-01-04 DIAGNOSIS — R09.81 SINUS CONGESTION: Primary | ICD-10-CM

## 2021-01-04 LAB
CTP QC/QA: YES
SARS-COV-2 RDRP RESP QL NAA+PROBE: NEGATIVE

## 2021-01-04 PROCEDURE — U0002: ICD-10-PCS | Mod: QW,CR,S$GLB, | Performed by: FAMILY MEDICINE

## 2021-01-04 PROCEDURE — U0002 COVID-19 LAB TEST NON-CDC: HCPCS | Mod: QW,CR,S$GLB, | Performed by: FAMILY MEDICINE

## 2021-04-30 ENCOUNTER — OFFICE VISIT (OUTPATIENT)
Dept: RHEUMATOLOGY | Facility: CLINIC | Age: 81
End: 2021-04-30
Payer: MEDICARE

## 2021-04-30 VITALS
DIASTOLIC BLOOD PRESSURE: 75 MMHG | BODY MASS INDEX: 26.48 KG/M2 | HEART RATE: 75 BPM | SYSTOLIC BLOOD PRESSURE: 129 MMHG | HEIGHT: 71 IN | WEIGHT: 189.13 LBS

## 2021-04-30 DIAGNOSIS — L50.9 URTICARIA: ICD-10-CM

## 2021-04-30 DIAGNOSIS — R76.8 POSITIVE ANA (ANTINUCLEAR ANTIBODY): ICD-10-CM

## 2021-04-30 DIAGNOSIS — L50.8 CHRONIC URTICARIA: ICD-10-CM

## 2021-04-30 DIAGNOSIS — R76.8 ANA POSITIVE: Primary | ICD-10-CM

## 2021-04-30 PROCEDURE — 99999 PR PBB SHADOW E&M-EST. PATIENT-LVL IV: ICD-10-PCS | Mod: PBBFAC,,, | Performed by: INTERNAL MEDICINE

## 2021-04-30 PROCEDURE — 99999 PR PBB SHADOW E&M-EST. PATIENT-LVL IV: CPT | Mod: PBBFAC,,, | Performed by: INTERNAL MEDICINE

## 2021-04-30 PROCEDURE — 99214 OFFICE O/P EST MOD 30 MIN: CPT | Mod: S$PBB,,, | Performed by: INTERNAL MEDICINE

## 2021-04-30 PROCEDURE — 99214 PR OFFICE/OUTPT VISIT, EST, LEVL IV, 30-39 MIN: ICD-10-PCS | Mod: S$PBB,,, | Performed by: INTERNAL MEDICINE

## 2021-04-30 PROCEDURE — 99214 OFFICE O/P EST MOD 30 MIN: CPT | Mod: PBBFAC,PO | Performed by: INTERNAL MEDICINE

## 2021-04-30 RX ORDER — HYDROXYCHLOROQUINE SULFATE 200 MG/1
200 TABLET, FILM COATED ORAL DAILY
Qty: 90 TABLET | Refills: 3 | Status: ON HOLD | OUTPATIENT
Start: 2021-04-30 | End: 2022-11-07 | Stop reason: HOSPADM

## 2021-04-30 ASSESSMENT — ROUTINE ASSESSMENT OF PATIENT INDEX DATA (RAPID3)
MDHAQ FUNCTION SCORE: 0
TOTAL RAPID3 SCORE: 0.17
PAIN SCORE: 0
FATIGUE SCORE: 0
PATIENT GLOBAL ASSESSMENT SCORE: 0.5
PSYCHOLOGICAL DISTRESS SCORE: 0

## 2021-08-20 ENCOUNTER — HOSPITAL ENCOUNTER (EMERGENCY)
Facility: HOSPITAL | Age: 81
Discharge: HOME | End: 2021-08-20
Attending: EMERGENCY MEDICINE
Payer: MEDICARE

## 2021-08-20 VITALS
DIASTOLIC BLOOD PRESSURE: 70 MMHG | HEART RATE: 80 BPM | TEMPERATURE: 98 F | WEIGHT: 163.13 LBS | SYSTOLIC BLOOD PRESSURE: 154 MMHG | RESPIRATION RATE: 20 BRPM | BODY MASS INDEX: 22.84 KG/M2 | HEIGHT: 71 IN | OXYGEN SATURATION: 97 %

## 2021-08-20 DIAGNOSIS — R06.02 SHORTNESS OF BREATH: ICD-10-CM

## 2021-08-20 DIAGNOSIS — J20.9 ACUTE BRONCHITIS, UNSPECIFIED ORGANISM: Primary | ICD-10-CM

## 2021-08-20 LAB
ADENOVIRUS: NOT DETECTED
ALBUMIN SERPL BCP-MCNC: 3.5 G/DL (ref 3.5–5.2)
ALP SERPL-CCNC: 41 U/L (ref 55–135)
ALT SERPL W/O P-5'-P-CCNC: 13 U/L (ref 10–44)
AMPHET+METHAMPHET UR QL: NEGATIVE
ANION GAP SERPL CALC-SCNC: 12 MMOL/L (ref 8–16)
APTT PPP: 26.6 SEC (ref 25.6–35.8)
AST SERPL-CCNC: 13 U/L (ref 10–40)
BARBITURATES UR QL SCN>200 NG/ML: NEGATIVE
BASOPHILS # BLD AUTO: 0.03 K/UL (ref 0–0.2)
BASOPHILS NFR BLD: 0.3 % (ref 0–1.9)
BENZODIAZ UR QL SCN>200 NG/ML: NEGATIVE
BILIRUB SERPL-MCNC: 1 MG/DL (ref 0.1–1)
BILIRUB UR QL STRIP: NEGATIVE
BNP SERPL-MCNC: 110 PG/ML (ref 0–99)
BORDETELLA PARAPERTUSSIS (IS1001): NOT DETECTED
BORDETELLA PERTUSSIS (PTXP): NOT DETECTED
BUN SERPL-MCNC: 50 MG/DL (ref 8–23)
BZE UR QL SCN: NEGATIVE
CALCIUM SERPL-MCNC: 9.1 MG/DL (ref 8.7–10.5)
CANNABINOIDS UR QL SCN: NEGATIVE
CHLAMYDIA PNEUMONIAE: NOT DETECTED
CHLORIDE SERPL-SCNC: 101 MMOL/L (ref 95–110)
CK SERPL-CCNC: 32 U/L (ref 20–200)
CLARITY UR: CLEAR
CO2 SERPL-SCNC: 25 MMOL/L (ref 23–29)
COLOR UR: YELLOW
CORONAVIRUS 229E, COMMON COLD VIRUS: NOT DETECTED
CORONAVIRUS HKU1, COMMON COLD VIRUS: NOT DETECTED
CORONAVIRUS NL63, COMMON COLD VIRUS: NOT DETECTED
CORONAVIRUS OC43, COMMON COLD VIRUS: NOT DETECTED
CREAT SERPL-MCNC: 1.9 MG/DL (ref 0.5–1.4)
CREAT UR-MCNC: 192 MG/DL (ref 23–375)
DIFFERENTIAL METHOD: ABNORMAL
EOSINOPHIL # BLD AUTO: 0 K/UL (ref 0–0.5)
EOSINOPHIL NFR BLD: 0 % (ref 0–8)
ERYTHROCYTE [DISTWIDTH] IN BLOOD BY AUTOMATED COUNT: 13.9 % (ref 11.5–14.5)
EST. GFR  (AFRICAN AMERICAN): 37.7 ML/MIN/1.73 M^2
EST. GFR  (NON AFRICAN AMERICAN): 32.6 ML/MIN/1.73 M^2
FLUBV RNA NPH QL NAA+NON-PROBE: NOT DETECTED
GLUCOSE SERPL-MCNC: 194 MG/DL (ref 70–110)
GLUCOSE UR QL STRIP: NEGATIVE
HCT VFR BLD AUTO: 46.1 % (ref 40–54)
HGB BLD-MCNC: 15.8 G/DL (ref 14–18)
HGB UR QL STRIP: NEGATIVE
HPIV1 RNA NPH QL NAA+NON-PROBE: NOT DETECTED
HPIV2 RNA NPH QL NAA+NON-PROBE: NOT DETECTED
HPIV3 RNA NPH QL NAA+NON-PROBE: NOT DETECTED
HPIV4 RNA NPH QL NAA+NON-PROBE: NOT DETECTED
HUMAN METAPNEUMOVIRUS: NOT DETECTED
IMM GRANULOCYTES # BLD AUTO: 0.04 K/UL (ref 0–0.04)
IMM GRANULOCYTES NFR BLD AUTO: 0.4 % (ref 0–0.5)
INFLUENZA A (SUBTYPES H1,H1-2009,H3): NOT DETECTED
INFLUENZA A, MOLECULAR: NEGATIVE
INFLUENZA B, MOLECULAR: NEGATIVE
INR PPP: 1.4
KETONES UR QL STRIP: NEGATIVE
LACTATE SERPL-SCNC: 1.7 MMOL/L (ref 0.5–1.9)
LACTATE SERPL-SCNC: 2.1 MMOL/L (ref 0.5–1.9)
LEUKOCYTE ESTERASE UR QL STRIP: NEGATIVE
LIPASE SERPL-CCNC: 65 U/L (ref 4–60)
LYMPHOCYTES # BLD AUTO: 1.4 K/UL (ref 1–4.8)
LYMPHOCYTES NFR BLD: 13.5 % (ref 18–48)
MAGNESIUM SERPL-MCNC: 2 MG/DL (ref 1.6–2.6)
MCH RBC QN AUTO: 31.4 PG (ref 27–31)
MCHC RBC AUTO-ENTMCNC: 34.3 G/DL (ref 32–36)
MCV RBC AUTO: 92 FL (ref 82–98)
MONOCYTES # BLD AUTO: 1 K/UL (ref 0.3–1)
MONOCYTES NFR BLD: 9.6 % (ref 4–15)
MYCOPLASMA PNEUMONIAE: NOT DETECTED
NEUTROPHILS # BLD AUTO: 7.6 K/UL (ref 1.8–7.7)
NEUTROPHILS NFR BLD: 76.2 % (ref 38–73)
NITRITE UR QL STRIP: NEGATIVE
NRBC BLD-RTO: 0 /100 WBC
OPIATES UR QL SCN: NEGATIVE
PCP UR QL SCN>25 NG/ML: NEGATIVE
PH UR STRIP: 6 [PH] (ref 5–8)
PLATELET # BLD AUTO: 260 K/UL (ref 150–450)
PMV BLD AUTO: 9.5 FL (ref 9.2–12.9)
POTASSIUM SERPL-SCNC: 4.6 MMOL/L (ref 3.5–5.1)
PROCALCITONIN SERPL IA-MCNC: <0.05 NG/ML (ref 0–0.5)
PROT SERPL-MCNC: 6.6 G/DL (ref 6–8.4)
PROT UR QL STRIP: ABNORMAL
PROTHROMBIN TIME: 16.9 SEC (ref 11.8–14.3)
RBC # BLD AUTO: 5.03 M/UL (ref 4.6–6.2)
RESPIRATORY INFECTION PANEL SOURCE: NORMAL
RSV RNA NPH QL NAA+NON-PROBE: NOT DETECTED
RV+EV RNA NPH QL NAA+NON-PROBE: NOT DETECTED
SARS-COV-2 RDRP RESP QL NAA+PROBE: NEGATIVE
SODIUM SERPL-SCNC: 138 MMOL/L (ref 136–145)
SP GR UR STRIP: 1.02 (ref 1–1.03)
SPECIMEN SOURCE: NORMAL
TOXICOLOGY INFORMATION: NORMAL
TROPONIN I SERPL DL<=0.01 NG/ML-MCNC: <0.03 NG/ML
TROPONIN I SERPL DL<=0.01 NG/ML-MCNC: <0.03 NG/ML
TSH SERPL DL<=0.005 MIU/L-ACNC: 0.78 UIU/ML (ref 0.34–5.6)
URN SPEC COLLECT METH UR: ABNORMAL
UROBILINOGEN UR STRIP-ACNC: NEGATIVE EU/DL
WBC # BLD AUTO: 9.98 K/UL (ref 3.9–12.7)

## 2021-08-20 PROCEDURE — 84484 ASSAY OF TROPONIN QUANT: CPT | Performed by: EMERGENCY MEDICINE

## 2021-08-20 PROCEDURE — 87502 INFLUENZA DNA AMP PROBE: CPT | Mod: 59 | Performed by: EMERGENCY MEDICINE

## 2021-08-20 PROCEDURE — 85730 THROMBOPLASTIN TIME PARTIAL: CPT | Performed by: EMERGENCY MEDICINE

## 2021-08-20 PROCEDURE — 84145 PROCALCITONIN (PCT): CPT | Performed by: EMERGENCY MEDICINE

## 2021-08-20 PROCEDURE — 87040 BLOOD CULTURE FOR BACTERIA: CPT | Mod: 59 | Performed by: EMERGENCY MEDICINE

## 2021-08-20 PROCEDURE — 85025 COMPLETE CBC W/AUTO DIFF WBC: CPT | Performed by: EMERGENCY MEDICINE

## 2021-08-20 PROCEDURE — 84443 ASSAY THYROID STIM HORMONE: CPT | Performed by: EMERGENCY MEDICINE

## 2021-08-20 PROCEDURE — 80053 COMPREHEN METABOLIC PANEL: CPT | Performed by: EMERGENCY MEDICINE

## 2021-08-20 PROCEDURE — 83735 ASSAY OF MAGNESIUM: CPT | Performed by: EMERGENCY MEDICINE

## 2021-08-20 PROCEDURE — 87633 RESP VIRUS 12-25 TARGETS: CPT | Performed by: EMERGENCY MEDICINE

## 2021-08-20 PROCEDURE — 25000003 PHARM REV CODE 250: Performed by: EMERGENCY MEDICINE

## 2021-08-20 PROCEDURE — 85610 PROTHROMBIN TIME: CPT | Performed by: EMERGENCY MEDICINE

## 2021-08-20 PROCEDURE — 83690 ASSAY OF LIPASE: CPT | Performed by: EMERGENCY MEDICINE

## 2021-08-20 PROCEDURE — 87798 DETECT AGENT NOS DNA AMP: CPT | Performed by: EMERGENCY MEDICINE

## 2021-08-20 PROCEDURE — 93010 EKG 12-LEAD: ICD-10-PCS | Mod: ,,, | Performed by: SPECIALIST

## 2021-08-20 PROCEDURE — 80307 DRUG TEST PRSMV CHEM ANLYZR: CPT | Performed by: EMERGENCY MEDICINE

## 2021-08-20 PROCEDURE — 83880 ASSAY OF NATRIURETIC PEPTIDE: CPT | Performed by: EMERGENCY MEDICINE

## 2021-08-20 PROCEDURE — 83605 ASSAY OF LACTIC ACID: CPT | Mod: 91 | Performed by: EMERGENCY MEDICINE

## 2021-08-20 PROCEDURE — U0002 COVID-19 LAB TEST NON-CDC: HCPCS | Performed by: EMERGENCY MEDICINE

## 2021-08-20 PROCEDURE — 93005 ELECTROCARDIOGRAM TRACING: CPT | Performed by: SPECIALIST

## 2021-08-20 PROCEDURE — 93010 ELECTROCARDIOGRAM REPORT: CPT | Mod: ,,, | Performed by: SPECIALIST

## 2021-08-20 PROCEDURE — 99285 EMERGENCY DEPT VISIT HI MDM: CPT

## 2021-08-20 PROCEDURE — 82550 ASSAY OF CK (CPK): CPT | Performed by: EMERGENCY MEDICINE

## 2021-08-20 PROCEDURE — 81003 URINALYSIS AUTO W/O SCOPE: CPT | Performed by: EMERGENCY MEDICINE

## 2021-08-20 RX ORDER — AZITHROMYCIN 250 MG/1
250 TABLET, FILM COATED ORAL DAILY
Qty: 6 TABLET | Refills: 0 | Status: SHIPPED | OUTPATIENT
Start: 2021-08-20 | End: 2021-09-14

## 2021-08-20 RX ADMIN — SODIUM CHLORIDE 250 ML: 0.9 INJECTION, SOLUTION INTRAVENOUS at 06:08

## 2021-08-20 RX ADMIN — SODIUM CHLORIDE 500 ML: 0.9 INJECTION, SOLUTION INTRAVENOUS at 04:08

## 2021-08-25 LAB
BACTERIA BLD CULT: NORMAL
BACTERIA BLD CULT: NORMAL

## 2021-11-01 ENCOUNTER — OFFICE VISIT (OUTPATIENT)
Dept: RHEUMATOLOGY | Facility: CLINIC | Age: 81
End: 2021-11-01
Payer: MEDICARE

## 2021-11-01 VITALS
WEIGHT: 175.25 LBS | HEIGHT: 71 IN | HEART RATE: 76 BPM | BODY MASS INDEX: 24.53 KG/M2 | DIASTOLIC BLOOD PRESSURE: 73 MMHG | SYSTOLIC BLOOD PRESSURE: 147 MMHG

## 2021-11-01 DIAGNOSIS — L50.9 URTICARIA: Primary | ICD-10-CM

## 2021-11-01 DIAGNOSIS — M51.36 LUMBAR DEGENERATIVE DISC DISEASE: ICD-10-CM

## 2021-11-01 DIAGNOSIS — G57.01 PIRIFORMIS SYNDROME OF RIGHT SIDE: ICD-10-CM

## 2021-11-01 DIAGNOSIS — M70.61 GREATER TROCHANTERIC BURSITIS OF RIGHT HIP: ICD-10-CM

## 2021-11-01 PROCEDURE — 99214 PR OFFICE/OUTPT VISIT, EST, LEVL IV, 30-39 MIN: ICD-10-PCS | Mod: S$PBB,,, | Performed by: INTERNAL MEDICINE

## 2021-11-01 PROCEDURE — 99999 PR PBB SHADOW E&M-EST. PATIENT-LVL V: CPT | Mod: PBBFAC,,, | Performed by: INTERNAL MEDICINE

## 2021-11-01 PROCEDURE — 99999 PR PBB SHADOW E&M-EST. PATIENT-LVL V: ICD-10-PCS | Mod: PBBFAC,,, | Performed by: INTERNAL MEDICINE

## 2021-11-01 PROCEDURE — 99215 OFFICE O/P EST HI 40 MIN: CPT | Mod: PBBFAC,PN | Performed by: INTERNAL MEDICINE

## 2021-11-01 PROCEDURE — 99214 OFFICE O/P EST MOD 30 MIN: CPT | Mod: S$PBB,,, | Performed by: INTERNAL MEDICINE

## 2021-11-01 ASSESSMENT — ROUTINE ASSESSMENT OF PATIENT INDEX DATA (RAPID3)
TOTAL RAPID3 SCORE: 2.67
PSYCHOLOGICAL DISTRESS SCORE: 0
MDHAQ FUNCTION SCORE: 0
PATIENT GLOBAL ASSESSMENT SCORE: 2
PAIN SCORE: 6
AM STIFFNESS SCORE: 0, NO
FATIGUE SCORE: 0

## 2021-11-05 PROBLEM — D69.0 ALLERGIC PURPURA: Status: ACTIVE | Noted: 2021-11-05

## 2022-04-22 PROBLEM — E11.29 TYPE 2 DIABETES MELLITUS WITH KIDNEY COMPLICATION, WITHOUT LONG-TERM CURRENT USE OF INSULIN: Status: ACTIVE | Noted: 2022-04-22

## 2022-04-27 ENCOUNTER — TELEPHONE (OUTPATIENT)
Dept: NEPHROLOGY | Facility: CLINIC | Age: 82
End: 2022-04-27
Payer: MEDICARE

## 2022-04-27 NOTE — TELEPHONE ENCOUNTER
Patient would like an appointment with  as a new pt. Patient would like a call back when the appt is made.

## 2022-04-27 NOTE — TELEPHONE ENCOUNTER
----- Message from Shannan Mak sent at 4/27/2022  2:32 PM CDT -----  Contact: pt [phone]  Type:  Sooner Appointment Request    Caller is requesting a sooner appointment.  Caller declined first available appointment listed below.  Caller will not accept being placed on the waitlist and is requesting a message be sent to doctor.    Name of Caller:  Pt  When is the first available appointment?  NA  Symptoms:  stage 3 kidney disease   Best Call Back Number:  [phone]    Additional Information: Pls call back and advise

## 2022-04-28 ENCOUNTER — TELEPHONE (OUTPATIENT)
Dept: NEPHROLOGY | Facility: CLINIC | Age: 82
End: 2022-04-28
Payer: MEDICARE

## 2022-04-28 NOTE — TELEPHONE ENCOUNTER
----- Message from Mireyadeepika Garcia sent at 4/28/2022 12:18 PM CDT -----  Contact: patient  Type:  Patient Returning Call    Who Called: patient  Who Left Message for Patient:  Mary  Does the patient know what this is regarding?:  appt  Best Call Back Number:  [phone] (home)   Additional Information:  I could not schedule the appt times that were offered to patient, the soonest I pulled up was 6/9. Please advise

## 2022-04-28 NOTE — TELEPHONE ENCOUNTER
Patient needs to be scheduled. I am sending this to Mary for scheduling. I told patient we would call back tomorrow and take care of that right away.

## 2022-05-01 PROBLEM — R80.9 MICROALBUMINURIA: Status: ACTIVE | Noted: 2022-05-01

## 2022-05-06 ENCOUNTER — TELEPHONE (OUTPATIENT)
Dept: NEPHROLOGY | Facility: CLINIC | Age: 82
End: 2022-05-06
Payer: MEDICARE

## 2022-05-06 NOTE — TELEPHONE ENCOUNTER
----- Message from Neyda Beasley sent at 5/6/2022  4:22 PM CDT -----  Contact: pt  Type: Needs Medical Advice    Who Called: pt  Best Call Back Number: [phone]    Inquiry/Question: needs to make an appt, he states he didn't know he had one today 05/06/2022 Thank you~

## 2022-05-10 ENCOUNTER — OFFICE VISIT (OUTPATIENT)
Dept: OTOLARYNGOLOGY | Facility: CLINIC | Age: 82
End: 2022-05-10
Payer: MEDICARE

## 2022-05-10 VITALS — TEMPERATURE: 99 F | HEIGHT: 71 IN | WEIGHT: 181.19 LBS | BODY MASS INDEX: 25.37 KG/M2

## 2022-05-10 DIAGNOSIS — R09.A2 GLOBUS SENSATION: ICD-10-CM

## 2022-05-10 DIAGNOSIS — J30.0 VMR (VASOMOTOR RHINITIS): ICD-10-CM

## 2022-05-10 DIAGNOSIS — R09.89 CHRONIC THROAT CLEARING: ICD-10-CM

## 2022-05-10 DIAGNOSIS — H61.23 BILATERAL IMPACTED CERUMEN: Primary | ICD-10-CM

## 2022-05-10 PROCEDURE — 31575 PR LARYNGOSCOPY, FLEXIBLE; DIAGNOSTIC: ICD-10-PCS | Mod: S$PBB,,, | Performed by: NURSE PRACTITIONER

## 2022-05-10 PROCEDURE — 99203 OFFICE O/P NEW LOW 30 MIN: CPT | Mod: 25,S$PBB,, | Performed by: NURSE PRACTITIONER

## 2022-05-10 PROCEDURE — 99203 PR OFFICE/OUTPT VISIT, NEW, LEVL III, 30-44 MIN: ICD-10-PCS | Mod: 25,S$PBB,, | Performed by: NURSE PRACTITIONER

## 2022-05-10 PROCEDURE — 69210 REMOVE IMPACTED EAR WAX UNI: CPT | Mod: 50,PBBFAC,PO | Performed by: NURSE PRACTITIONER

## 2022-05-10 PROCEDURE — 31575 DIAGNOSTIC LARYNGOSCOPY: CPT | Mod: PBBFAC,PO | Performed by: NURSE PRACTITIONER

## 2022-05-10 PROCEDURE — 99999 PR PBB SHADOW E&M-EST. PATIENT-LVL IV: ICD-10-PCS | Mod: PBBFAC,,, | Performed by: NURSE PRACTITIONER

## 2022-05-10 PROCEDURE — 69210 REMOVE IMPACTED EAR WAX UNI: CPT | Mod: 51,S$PBB,, | Performed by: NURSE PRACTITIONER

## 2022-05-10 PROCEDURE — 99999 PR PBB SHADOW E&M-EST. PATIENT-LVL IV: CPT | Mod: PBBFAC,,, | Performed by: NURSE PRACTITIONER

## 2022-05-10 PROCEDURE — 69210 PR REMOVAL IMPACTED CERUMEN REQUIRING INSTRUMENTATION, UNILATERAL: ICD-10-PCS | Mod: 51,S$PBB,, | Performed by: NURSE PRACTITIONER

## 2022-05-10 PROCEDURE — 99214 OFFICE O/P EST MOD 30 MIN: CPT | Mod: PBBFAC,PO,25 | Performed by: NURSE PRACTITIONER

## 2022-05-10 PROCEDURE — 31575 DIAGNOSTIC LARYNGOSCOPY: CPT | Mod: S$PBB,,, | Performed by: NURSE PRACTITIONER

## 2022-05-10 RX ORDER — OMEPRAZOLE 40 MG/1
40 CAPSULE, DELAYED RELEASE ORAL
Qty: 30 CAPSULE | Refills: 11 | Status: SHIPPED | OUTPATIENT
Start: 2022-05-10 | End: 2023-03-21 | Stop reason: CLARIF

## 2022-05-10 RX ORDER — IPRATROPIUM BROMIDE 42 UG/1
2 SPRAY, METERED NASAL 3 TIMES DAILY
Qty: 15 ML | Refills: 12 | Status: SHIPPED | OUTPATIENT
Start: 2022-05-10 | End: 2022-11-06 | Stop reason: CLARIF

## 2022-05-10 NOTE — PATIENT INSTRUCTIONS
"Mucous (Post nasal drip) Management     A fullness sensation in the back of the throat is called "globus."   Many people attribute this fullness to a sensation of increased mucous, because they can feel a sticky material in the throat that they will occasionally cough up.     Nasal  / Throat Mucous  Our body NORMALLY makes 1 liter or more of saliva (spit) and nasal mucous every day. These body fluids are important for breaking down the food we eat, protecting our teeth from cavities, and clearing out the pollen and irritants that we breathe in our nose. As our body makes these fluids, we swallow them throughout the day, where they are recycled back through the body. This process is happening all our life without us thinking about it. When an adjustment to this process causes the mucous to be increased or thicker, is when we notice it.      Some problems cause an INCREASE in these body fluids, which we perceive as irritating, excess phlegm in the throat.   However, it is not always an increase. Many times there is a change in CONSISTENCY of the mucous to make it thicker. This will cause the mucous to be held up in the throat instead of being swallowed easily.     Several factors can cause these problems:  Dryness of throat and nose which increases the mucous sticking - Causes include inadequate hydration, excess caffeine / soda / sugary drinks, medication side effects.  Acid reflux  Increased mucous production from allergies or chronic sinus drainage.      We will evaluate the cause(s) of increased or thickened mucous and consider different treatment strategies:     MANY COMMON medications cause dryness of the throat, including medications for allergy, depression/anxiety, heart, and urination issues.   There is some evidence that added sugars or processed sugars in the diet (not the kind that occur naturally in honey or ripe fruit) can increase mucus, as well as too much dairy. To avoid these refined carbohydrates, " "on food labels, watch out for wheat flour (also called white, refined or enriched flour) on the ingredients list.      Recommendations for Increased Mucous Sensation     Water, water, water - this cannot be understated. Most people are not drinking enough water regularly to keep up with body's demand. Recommend AT LEAST 64 oz of water per day, and more for men (up to 100 oz.) unless a medical issue prevents this.  Reduce intake of caffeine / tea / sugary drinks or soda, which all will cause increased mucous.  If your nose is the source of mucous (if you must repeated clear your nose of mucus in order to effectively pass air through your nose daily), then nasal medications discussed by your doctor as well as nasal saline can be effective to wash away the mucous.  However if your nose is essentially open and clear (no mucus), then prevention of acid reflux is advised by avoiding late-night eating (nothing 3 hours before laying down at night), greasy and spicy foods, alcohol, and acidic foods.   Humidifier in the bedroom if you find dryness increases at night.  Smoking cessation if you use tobacco  Examination your medication list with your doctor to determine medications that may be contributing     There are NUMEROUS over the counter mucous thinning agents. Here are some suggestions that can be purchased online:  Lewis's Breezer's (Sugar Free), Grether's black currant pastilles, and Entertainer's Secret Throat Relief can all help dry mouth and thickened mucous.   Biotene spray and mouthwash can help lubricate a dry mouth  Mucinex can be helpful in some cases, but stop taking if you don't notice improvement after a few weeks.         DIFFERENT TYPES OF "ENT-APPROVED" NASAL SPRAYS:  Flonase / fluticasone / Nasacort / Rhinocort (steroid spray) is best for stuffy, pressure, fullness.  Astelin / azelastine (antihistamine spray) is best for itchy, drippy, sneezy.  Atrovent / ipratropium is best for chronic watery " "nasal drip ("leaky faucet" nose), which may worsen with eating.    Use as directed, spraying 1-2 times in each nostril each day. It may take 2-3 days to 2-3 weeks to begin seeing improvement. This medication needs to be taken consistently to see results. Overall, this is a well-tolerated medication with low side effects. The benefit of nasal steroids as opposed to oral steroids is that the nasal steroid spray works primarily in the nose. Common side effects can include: headache, nasal dryness, minor nose bleed.  Rare side effects may include:  septal perforation, elevation in eye pressure, dry eyes, change in smell, allergic reaction.  Notify your provider if you have any concerns or experience these symptoms.     Nasal spray instructions:  Blow nose first gently to clean. Keep chin level with the floor (do not tilt head forward or back). Using the opposite hand (example: right hand for left nostril, left hand for right nostril) insert nasal spray taking caution to direct it AWAY from the middle wall inside the nose (septum) to avoid irritating nasal septum which could cause nosebleed.  Do not tilt spray up but rather flat and out along the roof of your mouth to spray. Angle the tip of the spray out slightly toward the direction of the ears; then spray. Do not take quick vigorous sniff but rather slow gentle inhalation while waiting for medication to absorb into nasal passages. Then administer second spray in same way.     Nasal saline rinse kit (use Neti pot or NeilMed sinus rinse kit) -- Rinse your sinuses once to twice daily to reduce the allergen burden in your nose. Use sterile water (boiled tap water which has cooled) or distilled bottled water. Add 1/4 teaspoon sea salt and a pinch of baking soda or a mixture packet from the maker of your sinus rinse kit.  Rinse through both sides of nose to cleanse sinus and nasal passages, bending forward with head tilted down. Keep your mouth open, without holding your " breath. Squeeze bottle gently until solution starts draining from the opposite nasal passage. After bottle is empty, blow nose very gently, without pinching nose completely, to avoid pressure on eardrums.  There are useful YouTube videos that show demonstration of how to do these properly.     Ponaris Nasal Emollient is used for the relief of: nasal congestion due to colds, nasal irritation, allergy exacerbations, nasal crusting. Specifically prepared iodized organic oils of pine, eucalyptus, peppermint, cajeput, and cottonseed. To order Ponaris: ask your pharmacist to order it for you or we carry it in our pharmacy downstairs on the first floor.

## 2022-05-10 NOTE — PROGRESS NOTES
Subjective:       Patient ID: Kuldip Espinal is a 81 y.o. male.    Chief Complaint: No chief complaint on file.    HPI   Patient is new to ENT, referred by MARCIA Ruvalcaba for consultation for sinusitis. Patient's chief concern is chronic throat clearing. Patient states it feels like he has a constant chest cold but never able to cough anything up. Patient also reports constant runny nose.   ALLERGY: No allergist or allergy testing. Patient takes nothing for allergies. Patient denies significant allergic stigmata:  No itchy, red, watery eyes; no itchy, red, watery nose; no excessive sneezing or stuffiness.   SINUS: Recent sinus imaging done at [facility]; awaiting transfer of images into Epic for view. Patient denies s/s of acute bacterial sinusitis:  No mucopus from nose or throat, no facial swelling/pain, no dental pain, no diminished olfaction/taste, no headaches around the eyes, no sore throat or productive cough.   ACID REFLUX:  No GI notes or EGD report available. Patient takes nothing for acid reflux. Patient reports frequent throat clearing.   ASTHMA:  h/o pulmonary IF. Chest CT shows chronic interstitial lung disease. Former smoker.   ACE-INHIBITOR: none    Review of Systems   Constitutional: Negative.  Negative for fever.   HENT: Positive for rhinorrhea. Negative for nasal congestion, dental problem, facial swelling, postnasal drip, sinus pressure/congestion, sneezing, sore throat, trouble swallowing and voice change.         Frequent throat clearing   Eyes: Negative.    Respiratory: Negative.  Negative for cough and choking.    Cardiovascular: Negative.    Gastrointestinal: Negative.    Musculoskeletal: Negative.    Integumentary:  Negative.   Neurological: Negative.  Negative for headaches.   Hematological: Negative.    Psychiatric/Behavioral: Negative.          Objective:      Physical Exam  Vitals and nursing note reviewed.   Constitutional:       General: He is not in acute distress.     Appearance: He is  well-developed. He is not ill-appearing or diaphoretic.   HENT:      Head: Normocephalic and atraumatic.      Right Ear: Hearing, tympanic membrane, ear canal and external ear normal. No middle ear effusion. Tympanic membrane is not erythematous.      Left Ear: Hearing, tympanic membrane, ear canal and external ear normal.  No middle ear effusion. Tympanic membrane is not erythematous.      Nose: Nose normal. No mucosal edema or rhinorrhea.      Right Sinus: No maxillary sinus tenderness or frontal sinus tenderness.      Left Sinus: No maxillary sinus tenderness or frontal sinus tenderness.      Mouth/Throat:      Mouth: Mucous membranes are not pale, not dry and not cyanotic. No oral lesions.      Pharynx: Uvula midline. No oropharyngeal exudate or posterior oropharyngeal erythema.   Eyes:      General: Lids are normal. No scleral icterus.        Right eye: No discharge.         Left eye: No discharge.      Conjunctiva/sclera: Conjunctivae normal.      Pupils: Pupils are equal, round, and reactive to light.   Neck:      Thyroid: No thyroid mass or thyromegaly.      Trachea: Trachea normal. No tracheal deviation.   Cardiovascular:      Rate and Rhythm: Normal rate.   Pulmonary:      Effort: Pulmonary effort is normal. No respiratory distress.      Breath sounds: No stridor. No wheezing.   Musculoskeletal:         General: Normal range of motion.      Cervical back: Normal range of motion and neck supple.   Lymphadenopathy:      Head:      Right side of head: No submental, submandibular, tonsillar, preauricular or posterior auricular adenopathy.      Left side of head: No submental, submandibular, tonsillar, preauricular or posterior auricular adenopathy.      Cervical: No cervical adenopathy.      Right cervical: No superficial or posterior cervical adenopathy.     Left cervical: No superficial or posterior cervical adenopathy.   Skin:     General: Skin is warm and dry.      Coloration: Skin is not pale.       Findings: No lesion or rash.   Neurological:      Mental Status: He is alert and oriented to person, place, and time.      Coordination: Coordination normal.      Gait: Gait normal.   Psychiatric:         Speech: Speech normal.         Behavior: Behavior normal. Behavior is cooperative.         Thought Content: Thought content normal.         Judgment: Judgment normal.       SEPARATE PROCEDURE IN OFFICE:   Procedure: Removal of impacted cerumen, bilateral   Pre Procedure Diagnosis: Cerumen Impaction   Post Procedure Diagnosis: Cerumen Impaction   Verbal informed consent in regards to risk of trauma to ear canal, ear drum or hearing, discomfort during procedure and/or inability to remove cerumen impaction in one session or unforeseen events or complications.   No anesthesia.     Procedure in detail:   Ear canal visualized bilateral with appropriate size ear speculum utilizing Operating Head Binocular Otomicroscope   Utilizing the following:  Ring curet, delicate alligator forceps, and/or suction cannula was used. The impacted cerumen of the ear canals was removed atraumatically. The TM and EAC were then inspected and found to be clear of wax. See description of TMs/EACs in PE above.   Complications: No   Condition: Improved/Good        Procedure: Flexible laryngoscopy    In order to fully examine the upper aerodigestive tract, including the larynx, in a patient with a hyperactive gag reflex, and suboptimal visualization with indirect mirror exam,  flexible endoscopy is required.   After explaining the procedure and obtaining verbal consent, a timeout was performed with the patient's participation according to the universal protocol. Both nasal cavities were anesthetized with 4% Xylocaine spray mixed with Carlos-Synephrine. The flexible laryngoscope  was inserted into the nasal cavity and advanced to visualize the nasal cavity, nasopharynx, the posterior oropharynx, hypopharynx, and the endolarynx with the  findings  noted. The scope was removed and the procedure terminated. The patient tolerated this procedure well without apparent complication.     OVERALL FINDINGS  Nasopharynx - the torus is clear. There are no lesions of the posterior wall.   Oropharynx - no lesions of the tongue base. There is no obvious fullness or asymmetry.  Hypopharynx - there are no lesions of the pyriform sinuses or postcricoid region   Larynx - there are no lesions of the supraglottic or glottic larynx.  Vocal fold mobility is normal.     SPECIFIC FINDINGS  Adenoid tissue - normal   Nasopharynx & eustachian tube orifices - normal   Posterior pharyngeal wall - normal   Base of tongue - normal   Epiglottis - normal   Valleculae - normal   Pyriform sinuses - normal   False vocal cords - normal   True vocal cords - normal  Arytenoids - normal   Interarytenoid space - normal           Assessment:       Problem List Items Addressed This Visit    None     Visit Diagnoses     Bilateral impacted cerumen    -  Primary    Chronic throat clearing        Relevant Medications    omeprazole (PRILOSEC) 40 MG capsule    Globus sensation        Relevant Medications    omeprazole (PRILOSEC) 40 MG capsule    VMR (vasomotor rhinitis)        Relevant Medications    ipratropium (ATROVENT) 42 mcg (0.06 %) nasal spray          Plan:     Omeprazole QAM AC    Atrovent BID-TID  Awaiting DIS images transferred into Epic for viewing  Patient encouraged to return to clinic if symptoms worsen/persist and as needed for further ENT symptoms or concerns.

## 2022-05-11 ENCOUNTER — OFFICE VISIT (OUTPATIENT)
Dept: PAIN MEDICINE | Facility: CLINIC | Age: 82
End: 2022-05-11
Payer: MEDICARE

## 2022-05-11 VITALS
DIASTOLIC BLOOD PRESSURE: 70 MMHG | WEIGHT: 180 LBS | SYSTOLIC BLOOD PRESSURE: 167 MMHG | HEIGHT: 71 IN | BODY MASS INDEX: 25.2 KG/M2 | HEART RATE: 61 BPM

## 2022-05-11 DIAGNOSIS — M51.36 LUMBAR DEGENERATIVE DISC DISEASE: ICD-10-CM

## 2022-05-11 DIAGNOSIS — M25.512 CHRONIC LEFT SHOULDER PAIN: ICD-10-CM

## 2022-05-11 DIAGNOSIS — M54.16 LUMBAR RADICULOPATHY, CHRONIC: ICD-10-CM

## 2022-05-11 DIAGNOSIS — M54.16 RIGHT LUMBAR RADICULOPATHY: Primary | ICD-10-CM

## 2022-05-11 DIAGNOSIS — G89.29 CHRONIC LEFT SHOULDER PAIN: ICD-10-CM

## 2022-05-11 DIAGNOSIS — M47.816 LUMBAR SPONDYLOSIS: ICD-10-CM

## 2022-05-11 PROCEDURE — 99204 PR OFFICE/OUTPT VISIT, NEW, LEVL IV, 45-59 MIN: ICD-10-PCS | Mod: 25,S$PBB,, | Performed by: ANESTHESIOLOGY

## 2022-05-11 PROCEDURE — 20610 DRAIN/INJ JOINT/BURSA W/O US: CPT | Mod: PBBFAC,PN | Performed by: ANESTHESIOLOGY

## 2022-05-11 PROCEDURE — 20610 PR DRAIN/INJECT LARGE JOINT/BURSA: ICD-10-PCS | Mod: S$PBB,RT,, | Performed by: ANESTHESIOLOGY

## 2022-05-11 PROCEDURE — 99999 PR PBB SHADOW E&M-EST. PATIENT-LVL IV: CPT | Mod: PBBFAC,,, | Performed by: ANESTHESIOLOGY

## 2022-05-11 PROCEDURE — 20610 DRAIN/INJ JOINT/BURSA W/O US: CPT | Mod: S$PBB,RT,, | Performed by: ANESTHESIOLOGY

## 2022-05-11 PROCEDURE — 99214 OFFICE O/P EST MOD 30 MIN: CPT | Mod: PBBFAC,PN,25 | Performed by: ANESTHESIOLOGY

## 2022-05-11 PROCEDURE — 99999 PR PBB SHADOW E&M-EST. PATIENT-LVL IV: ICD-10-PCS | Mod: PBBFAC,,, | Performed by: ANESTHESIOLOGY

## 2022-05-11 PROCEDURE — 99204 OFFICE O/P NEW MOD 45 MIN: CPT | Mod: 25,S$PBB,, | Performed by: ANESTHESIOLOGY

## 2022-05-11 RX ORDER — METHYLPREDNISOLONE ACETATE 40 MG/ML
40 INJECTION, SUSPENSION INTRA-ARTICULAR; INTRALESIONAL; INTRAMUSCULAR; SOFT TISSUE
Status: COMPLETED | OUTPATIENT
Start: 2022-05-11 | End: 2022-05-11

## 2022-05-11 RX ADMIN — METHYLPREDNISOLONE ACETATE 40 MG: 40 INJECTION, SUSPENSION INTRA-ARTICULAR; INTRALESIONAL; INTRAMUSCULAR; INTRASYNOVIAL; SOFT TISSUE at 08:05

## 2022-05-11 NOTE — PROGRESS NOTES
This note was completed with dictation software and grammatical errors may exist.    Chief Complaint   Patient presents with    Leg Pain    Arm Pain    Low-back Pain        HPI: Kuldip Espinal is a 81 y.o. year old male patient who has a past medical history of a CAD With Stenting, a Chronic Anticoagulation With Xarelto, a Paroxysmal Atrial Fibrillation, b Hypertension, b Microalbuminuria, b Stage 3 CKD, c Hypercholesterolemia With Low HDL, c Hypertriglyceridemia, d Type 2 Diabetes Mellitus, i Dyspnea On Exertion, i Pulmonary Interstitial Fibrosis On 03/2015 Chest CT, j Asymptomatic Cholelithiasis, j Colon CA S/P Partial Colectomy 2008, j Diverticulosis, k Benign Prostate Hypertrophy, k Bilateral Renal Cysts, k Elevated PSA Levels, l GEORGES positive, l Bilateral Hand Swelling And Pain, l Bilateral Thumb MCP Joint Osteoarthritis, l Chronic left shoulder pain, l Chronic Lower Back Pain, l H/O Left TKR 02/2013, l Lumbar Spinal DDD, l Thoracic Spinal DDD, n Situational Anxiety And Depression, q Chronic Eczema, q Recurrent Urticaria Vasculitis, q Toenail Onychomycosis, q Vitamin D deficiency, and Wellness Visit 4/22/2022. He presents in referral from Dr. Evens Mcpherson for back pain.  He reports having back pain and right hip pain for over a year.  He states that the pain is  Located in his right lateral thigh and low back.  Is worse with sitting for a long time, longer he sits, if he cannot extend his leg pain worsens.  It also hurts with walking.  The pain is across the right side of his back and right lateral thigh, does get some relief with lying down and rest.  Also gets some relief with Tylenol and ibuprofen.  He tried physical therapy for about 4 weeks and this did not seem to improve it, actually aggravated the pain.  He denies any maggie weakness or numbness.    His other complaint is left shoulder pain, is worse with using his arm, he states that he feels somewhat weak there.  That is located in the lateral  deltoid, does not extend past the elbow, does not radiate into the shoulder blade or in the neck.  He has pain with reaching forward, reaching behind him.      Pain intervention history:  He had a right GTB injection with no major improvement.    Spine surgeries:    Antineuropathics: Gabapentin 100mg   Just in the morning, no side effects but no benefits just yet  NSAIDs:  Physical therapy:Did greater than 4 weeks of physical therapy at [place] several months ago, no benefit with this.  Antidepressants:  Muscle relaxers:  Opioids:  Antiplatelets/Anticoagulants: ASA 81, Xarelto 15mg        ROS:  He reports cough and back pain.  Balance of review of systems is negative.    Lab Results   Component Value Date    LABA1C 5.4 05/22/2018    HGBA1C 6.0 (H) 04/21/2022       Lab Results   Component Value Date    WBC 8.7 04/21/2022    HGB 15.5 04/21/2022    HCT 46.4 04/21/2022    MCV 91.7 04/21/2022     04/21/2022             Past Medical History:   Diagnosis Date    a CAD With Stenting     Dr. Eleuterio Knott At [facility]    a Chronic Anticoagulation With Xarelto ###    Dr. Eleuterio Knott At [facility]    a Paroxysmal Atrial Fibrillation ###    Dr. Eleuterio Knott At [facility]    b Hypertension     Norvasc 5 mg D/Cd On 8/28/15    b Microalbuminuria     On Ramipril 10 Mg BID    b Stage 3 CKD ###    4/22/22 Referred To Dr. Leandro Live; He Used To See Dr. Kuldip hollis Hypercholesterolemia With Low HDL     On Crestor 20 Mg qHS    c Hypertriglyceridemia     On Lovaza 2 Gm Bid    d Type 2 Diabetes Mellitus     12/5/17 RXd Trulicity 0.75 Mg SQ Weekly; On Amary 2 Mg Daily; D/Cd Glucotrol 10 Mg Bid; 11/22/16 Added Januvia 50 Mg Daily To His Glucotrol 10 Mg Bid; He Has Had Full ADA Training With Bree Shepard    i Dyspnea On Exertion ###    i Pulmonary Interstitial Fibrosis On 03/2015 Chest CT ###    9/9/20 Referred To Dr. Colin benavidez Asymptomatic Cholelithiasis ###    2/4/19 CT Urogram (D.I.S.) = Report Scanned    j  "Colon CA S/P Partial Colectomy 2008     Dr. Hayes benavidez Diverticulosis     Dr. Hayes martinez Benign Prostate Hypertrophy     Dr. Vernon Solano; Dr. Norris Florian Retired; On Cialis 5 Mg Daily For This    k Bilateral Renal Cysts     Dr. Vernon Solano; 2/4/19 CT Urogram (D.I.S.) = Report Scanned    k Elevated PSA Levels ###    Dr. Vernon Solano; Dr. Norris Florian Retired; I Always Send Lab Results To Dr. Solano    l GEORGES positive     Dr. Adeola Sarkar; 6/3/19 RF, UA Level, And ESR = Normal    l Bilateral Hand Swelling And Pain     2/22/19 Bilateral Hand XRays = (See Report)    l Bilateral Thumb MCP Joint Osteoarthritis     2/22/19 Bilateral Hand XRays = (See Report)    l Chronic left shoulder pain     Dr. Heike art Chronic Lower Back Pain     l H/O Left TKR 02/2013     Dr. Heike art Lumbar Spinal DDD     4/22/22 Referred To Dr. Santos art Thoracic Spinal DDD     On 03/2015 Chest CT     n Situational Anxiety And Depression     RTC 6 Weeks; RXd Wellbutrin  Mg qAM; Lexapro Also Worked Well    q Chronic Eczema     9/5/19 Referred To Dr. Behzad King; Dr. Adeola Sarkar; Dr. Erna Gastelum    q Recurrent Urticaria Vasculitis ########    Dr. Adeola Sarkar    q Toenail Onychomycosis     3/5/20 Referred To Dr. Wolfgang Cruz; 7/17/15 RXd Lamisil 250 Mg Daily X 3 Months    q Vitamin D deficiency     9/9/20 RXd OTC D3 5K IU daily; 5/28/18 Increased OTC D3 5K IU Daily To 10K IU Daily    Wellness Visit 4/22/2022        No past surgical history on file.    Social History     Socioeconomic History    Marital status:    Tobacco Use    Smoking status: Former Smoker    Smokeless tobacco: Never Used         Medications/Allergies: See med card    Vitals:    05/11/22 0729   BP: (!) 167/70   Pulse: 61   Weight: 81.7 kg (180 lb 0.1 oz)   Height: 5' 11" (1.803 m)   PainSc:   3   PainLoc: Leg     Body mass index is 25.11 kg/m².    Physical exam:    Gen: A and O x3, pleasant, " well-groomed  Skin: No rashes or obvious lesions  HEENT: PERRLA, no obvious deformities on ears or in canals. Trachea midline.  CVS: Regular rate and rhythm, normal palpable pulses.  Resp:No increased work of breathing, symmetrical chest rise.  Abdomen: Soft, NT/ND.  Musculoskeletal: No antalgic gait.           Neuro:    Iliopsoas Quadriceps Knee  Flexion Tibialis  anterior Gastro- cnemius EHL   Lower: R 5/5 5/5 5/5 5/5 5/5 5/5    L 5/5 5/5 5/5 5/5 5/5 5/5      Left  Right    Patellar DTR 0+ 1+   Achilles DTR 0+ 0+   Khan Absent  Absent   Clonus Absent Absent   Babinski Absent Absent     Intact and symmetrical to light touch and pinprick in L1-S1 dermatomes bilaterally.    Lumbar spine:  Lumbar spine: ROM is  Moderately reduced with flexion and extension and oblique extension with no major increased pain on any the new hers  Deshaun's test causes no increased pain on either side.    Supine straight leg raise is negative bilaterally.    Internal and external rotation of the hip causes no increased pain on either side.  Myofascial exam: No tenderness to palpation across lumbar paraspinous muscles. There is tenderness to palpation over the mid and distal lateral thigh but no pain over the greater trochanter itself.    Imagin21 Xray right pelvis/hip:  No fracture is identified.  The femoral head appears to have a normal relation with the acetabulum.  There are vascular calcifications present.  Degenerative changes are noted in the visualized portion of the lower lumbar spine.    Assessment:   Kuldip Espinal is a 81 y.o. year old male patient who has a past medical history of a CAD With Stenting, a Chronic Anticoagulation With Xarelto, a Paroxysmal Atrial Fibrillation, b Hypertension, b Microalbuminuria, b Stage 3 CKD, c Hypercholesterolemia With Low HDL, c Hypertriglyceridemia, d Type 2 Diabetes Mellitus, i Dyspnea On Exertion, i Pulmonary Interstitial Fibrosis On 2015 Chest CT, j Asymptomatic  Cholelithiasis, j Colon CA S/P Partial Colectomy 2008, j Diverticulosis, k Benign Prostate Hypertrophy, k Bilateral Renal Cysts, k Elevated PSA Levels, l GEORGES positive, l Bilateral Hand Swelling And Pain, l Bilateral Thumb MCP Joint Osteoarthritis, l Chronic left shoulder pain, l Chronic Lower Back Pain, l H/O Left TKR 02/2013, l Lumbar Spinal DDD, l Thoracic Spinal DDD, n Situational Anxiety And Depression, q Chronic Eczema, q Recurrent Urticaria Vasculitis, q Toenail Onychomycosis, q Vitamin D deficiency, and Wellness Visit 4/22/2022. He presents in referral from Dr. Evens Mcpherson for back pain.    1. Right lumbar radiculopathy  MRI Lumbar Spine Without Contrast   2. Lumbar Spinal DDD  Ambulatory referral/consult to Pain Clinic    MRI Lumbar Spine Without Contrast   3. Lumbar radiculopathy, chronic  MRI Lumbar Spine Without Contrast   4. Lumbar spondylosis  MRI Lumbar Spine Without Contrast       Plan:    1.  We discussed his symptoms, reviewed his pelvic x-ray which shows normal hips, does have significant degeneration in the lower lumbar spine.  I think this is a radicular pain, I am going to get an MRI of his lumbar spine and call him with the results.  2. For his left shoulder pain, he likely has arthritis, we set him up with an injection today, if this provides relief, we can repeat this in the future.  If he is not having significant benefit, I would recommend that he follow-up with orthopedics regarding this.      Procedure: Shoulder injection  Diagnosis: Shoulder pain  We reviewed informed consent, discussed risks, signed.  The right shoulder was prepped with ChloraPrep and using a 27-gauge 1.5 inch needle was placed at the posterior subacromial entrance of the shoulder joint, negative aspiration for blood or fluid, and 40 mg of methyl prednisone and 1 mL of 0.25% bupivacaine were instilled.  The needle was removed and a bandage applied.  The patient tolerated the procedure well.        Thank you for  referring this interesting patient, and I look forward to continuing to collaborate in his care.

## 2022-05-26 ENCOUNTER — HOSPITAL ENCOUNTER (OUTPATIENT)
Dept: RADIOLOGY | Facility: HOSPITAL | Age: 82
Discharge: HOME | End: 2022-05-26
Attending: ANESTHESIOLOGY
Payer: MEDICARE

## 2022-05-26 DIAGNOSIS — M47.816 LUMBAR SPONDYLOSIS: ICD-10-CM

## 2022-05-26 DIAGNOSIS — M54.16 RIGHT LUMBAR RADICULOPATHY: ICD-10-CM

## 2022-05-26 DIAGNOSIS — M54.16 LUMBAR RADICULOPATHY, CHRONIC: ICD-10-CM

## 2022-05-26 DIAGNOSIS — M51.36 LUMBAR DEGENERATIVE DISC DISEASE: ICD-10-CM

## 2022-05-26 PROCEDURE — 72148 MRI LUMBAR SPINE W/O DYE: CPT | Mod: 26,,, | Performed by: RADIOLOGY

## 2022-05-26 PROCEDURE — 72148 MRI LUMBAR SPINE W/O DYE: CPT | Mod: TC,PO

## 2022-05-26 PROCEDURE — 72148 MRI LUMBAR SPINE WITHOUT CONTRAST: ICD-10-PCS | Mod: 26,,, | Performed by: RADIOLOGY

## 2022-05-30 ENCOUNTER — OFFICE VISIT (OUTPATIENT)
Dept: NEPHROLOGY | Facility: CLINIC | Age: 82
End: 2022-05-30
Payer: MEDICARE

## 2022-05-30 VITALS
HEART RATE: 68 BPM | WEIGHT: 181 LBS | OXYGEN SATURATION: 96 % | BODY MASS INDEX: 25.34 KG/M2 | HEIGHT: 71 IN | DIASTOLIC BLOOD PRESSURE: 70 MMHG | SYSTOLIC BLOOD PRESSURE: 144 MMHG

## 2022-05-30 DIAGNOSIS — I10 PRIMARY HYPERTENSION: ICD-10-CM

## 2022-05-30 DIAGNOSIS — N18.30 STAGE 3 CHRONIC KIDNEY DISEASE, UNSPECIFIED WHETHER STAGE 3A OR 3B CKD: Primary | ICD-10-CM

## 2022-05-30 DIAGNOSIS — N28.1 KIDNEY CYSTS: ICD-10-CM

## 2022-05-30 DIAGNOSIS — N18.30 TYPE 2 DIABETES MELLITUS WITH STAGE 3 CHRONIC KIDNEY DISEASE, WITHOUT LONG-TERM CURRENT USE OF INSULIN, UNSPECIFIED WHETHER STAGE 3A OR 3B CKD: ICD-10-CM

## 2022-05-30 DIAGNOSIS — E11.22 TYPE 2 DIABETES MELLITUS WITH STAGE 3 CHRONIC KIDNEY DISEASE, WITHOUT LONG-TERM CURRENT USE OF INSULIN, UNSPECIFIED WHETHER STAGE 3A OR 3B CKD: ICD-10-CM

## 2022-05-30 PROCEDURE — 99999 PR PBB SHADOW E&M-EST. PATIENT-LVL V: CPT | Mod: PBBFAC,,, | Performed by: INTERNAL MEDICINE

## 2022-05-30 PROCEDURE — 99215 OFFICE O/P EST HI 40 MIN: CPT | Mod: PBBFAC,PN | Performed by: INTERNAL MEDICINE

## 2022-05-30 PROCEDURE — 99204 PR OFFICE/OUTPT VISIT, NEW, LEVL IV, 45-59 MIN: ICD-10-PCS | Mod: S$PBB,,, | Performed by: INTERNAL MEDICINE

## 2022-05-30 PROCEDURE — 99999 PR PBB SHADOW E&M-EST. PATIENT-LVL V: ICD-10-PCS | Mod: PBBFAC,,, | Performed by: INTERNAL MEDICINE

## 2022-05-30 PROCEDURE — 99204 OFFICE O/P NEW MOD 45 MIN: CPT | Mod: S$PBB,,, | Performed by: INTERNAL MEDICINE

## 2022-05-30 NOTE — PROGRESS NOTES
Subjective:       Patient ID: Kuldip Espinal is a 81 y.o. White male who presents for new patient evaluation for chronic renal failure.    Kuldip Espinal is referred by Evens Mcpherson MD to be evaluated for chronic renal failure.  He states he had a renal biopsy in the past but does not know the results.  He had been taking ibuprofen regularly for R sciatica but he is off of this now and is taking gabapentin.  He has no uremic or urinary symptoms and is in his usual state of health.  There have been no recent illnesses, hospitalizations or procedures.        Review of Systems   Constitutional: Negative for appetite change, chills and fever.   HENT: Negative for congestion.    Eyes: Negative for visual disturbance.   Respiratory: Negative for cough and shortness of breath.    Cardiovascular: Negative for chest pain and leg swelling.   Gastrointestinal: Negative for abdominal pain, diarrhea, nausea and vomiting.   Genitourinary: Negative for difficulty urinating, dysuria and hematuria.   Musculoskeletal: Negative for myalgias.   Skin: Negative for rash.   Neurological: Negative for headaches.   Psychiatric/Behavioral: Negative for sleep disturbance.       The past medical, family and social histories were reviewed for this encounter.     Past Medical History:   Diagnosis Date    a CAD With Stenting     Dr. Eleuterio Knott At [facility]    a Chronic Anticoagulation With Xarelto ###    Dr. Eleuterio Knott At [facility]    a Paroxysmal Atrial Fibrillation ###    Dr. Eleuterio Knott At [facility]    b Hypertension     Norvasc 5 mg D/Cd On 8/28/15    b Microalbuminuria     On Ramipril 10 Mg BID    b Stage 3 CKD ###    4/22/22 Referred To Dr. Leandro Live; He Used To See Dr. 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 Colin benavidez Asymptomatic Cholelithiasis ###    2/4/19 CT Urogram (D.I.S.) = Report Scanned    j Colon CA S/P Partial Colectomy 2008     Dr. Hayes benavidez Diverticulosis     Dr. Hayes martinez Benign Prostate Hypertrophy     Dr. Vernon Solano; Dr. Norris Florian Retired; On Cialis 5 Mg Daily For This    k Bilateral Renal Cysts     Dr. Vernon Solano; 2/4/19 CT Urogram (D.I.S.) = Report Scanned    k Elevated PSA Levels ###    Dr. Vernon Solano; Dr. Norris Florian Retired; I Always Send Lab Results To Dr. Russ art [place] positive     Dr. Adeola Sarkar; 6/3/19 RF, UA Level, And ESR = Normal    l Bilateral Hand Swelling And Pain     2/22/19 Bilateral Hand XRays = (See Report)    l Bilateral Thumb MCP Joint Osteoarthritis     2/22/19 Bilateral Hand XRays = (See Report)    l Chronic left shoulder pain     Dr. Heike art Chronic Lower Back Pain     l H/O Left TKR 02/2013     Dr. Heike art Lumbar Spinal DDD     4/22/22 Referred To Dr. Santos art Thoracic Spinal DDD     On 03/2015 Chest CT     n Situational Anxiety And Depression     RTC 6 Weeks; RXd Wellbutrin  Mg qAM; Lexapro Also Worked Well    q Chronic Eczema     9/5/19 Referred To Dr. Behzad King; Dr. Adeola Sarkar; Dr. Erna Gastelum    q Recurrent Urticaria Vasculitis ########    Dr. Adeola Sarkar    q Toenail Onychomycosis     3/5/20 Referred To Dr. Wolfgang Cruz; 7/17/15 RXd Lamisil 250 Mg Daily X 3 Months    q Vitamin D deficiency     9/9/20 RXd OTC D3 5K IU daily; 5/28/18 Increased OTC D3 5K IU Daily To 10K IU Daily    Wellness Visit 4/22/2022      No past surgical history on file.  Social History     Socioeconomic History    Marital status:    Tobacco Use    Smoking status:  Former Smoker    Smokeless tobacco: Never Used     Current Outpatient Medications   Medication Sig    acetaminophen (TYLENOL) 650 MG TbSR Take 1 tablet (650 mg total) by mouth 2 (two) times daily as needed (for mild to moderate pain).    amLODIPine (NORVASC) 2.5 MG tablet Take 2.5 mg by mouth every morning.    aspirin (ECOTRIN) 81 MG EC tablet Take 1 tablet by mouth once daily.    blood sugar diagnostic Strp MARKIE MICROLET LANCETS MISC    blood sugar diagnostic Strp 1 strip by Misc.(Non-Drug; Combo Route) route before breakfast. Diagnosis code E11.9    blood-glucose meter (BLOOD GLUCOSE MONITORING) kit Use as instructed    cephALEXin (KEFLEX) 500 MG capsule as needed. For dental wok    cholecalciferol, vitamin D3, 125 mcg (5,000 unit) Tab Take 1 tablet (5,000 Units total) by mouth once daily.    CRESTOR 20 mg tablet Take 1 tablet by mouth once daily.    dutasteride (AVODART) 0.5 mg capsule Take 0.5 mg by mouth once daily.     fluocinolone (SYNALAR) 0.01 % Sham Apply topically once daily.    gabapentin (NEURONTIN) 100 MG capsule Take 1 capsule (100 mg total) by mouth 2 (two) times daily as needed (for sciatic pain).    hydroCHLOROthiazide (HYDRODIURIL) 12.5 MG Tab Take 1 tablet (12.5 mg total) by mouth every morning.    hydrOXYchloroQUINE (PLAQUENIL) 200 mg tablet Take 1 tablet (200 mg total) by mouth once daily.    ipratropium (ATROVENT) 42 mcg (0.06 %) nasal spray 2 sprays by Nasal route 3 (three) times daily. Use 2-3 times per day if necessary for chronic nasal drip    JANUVIA 50 mg Tab TAKE 1 TABLET BY MOUTH EVERY DAY (Patient taking differently: Take 50 mg by mouth once daily.)    lancets 30 gauge Misc USE1 LANCET BEFORE BREAKFAST.    lancets Misc 1 lancet by Misc.(Non-Drug; Combo Route) route before breakfast. Diagnosis code E11.9    metoprolol succinate (TOPROL-XL) 25 MG 24 hr tablet Take 1 tablet (25 mg total) by mouth every morning.    multivitamin (THERAGRAN) per tablet Take 1 tablet by  "mouth once daily.    mupirocin (BACTROBAN) 2 % ointment Apply topically 3 (three) times daily.    omega-3 acid ethyl esters (LOVAZA) 1 gram capsule Take 2 capsules by mouth 2 (two) times daily.    omeprazole (PRILOSEC) 40 MG capsule Take 1 capsule (40 mg total) by mouth before breakfast. Take on empty stomach 30-60 minutes before meal    ramipril (ALTACE) 10 MG capsule Take 10 mg by mouth 2 (two) times daily.    sildenafiL (VIAGRA) 25 MG tablet Take 25 mg by mouth daily as needed for Erectile Dysfunction.    vitamin A-vitamin C-vit E-min Tab Take 1 tablet by mouth once daily. [facility] FORMULA CAPS    XARELTO 15 mg Tab Take 15 mg by mouth once daily.      No current facility-administered medications for this visit.       BP (!) 144/70 (BP Location: Left arm, Patient Position: Sitting, BP Method: Medium (Manual))   Pulse 68   Ht 5' 11" (1.803 m)   Wt 82.1 kg (181 lb)   SpO2 96%   BMI 25.24 kg/m²     Objective:      Physical Exam  Vitals reviewed.   Constitutional:       General: He is not in acute distress.     Appearance: He is well-developed.   HENT:      Head: Normocephalic and atraumatic.   Eyes:      General: No scleral icterus.     Conjunctiva/sclera: Conjunctivae normal.   Neck:      Vascular: No JVD.   Cardiovascular:      Rate and Rhythm: Normal rate and regular rhythm.      Heart sounds: Normal heart sounds. No murmur heard.    No friction rub. No gallop.   Pulmonary:      Effort: Pulmonary effort is normal. No respiratory distress.      Breath sounds: Normal breath sounds. No wheezing or rales.   Abdominal:      General: Bowel sounds are normal. There is no distension.      Palpations: Abdomen is soft.      Tenderness: There is no abdominal tenderness.   Musculoskeletal:      Cervical back: Normal range of motion.      Right lower leg: No edema.      Left lower leg: No edema.   Skin:     General: Skin is warm and dry.      Findings: No rash.   Neurological:      Mental Status: He is " alert and oriented to person, place, and time.   Psychiatric:         Mood and Affect: Mood normal.         Behavior: Behavior normal.         Assessment:       1. Stage 3 chronic kidney disease, unspecified whether stage 3a or 3b CKD    2. Primary hypertension    3. Bilateral Renal Cysts    4. Type 2 diabetes mellitus with stage 3 chronic kidney disease, without long-term current use of insulin, unspecified whether stage 3a or 3b CKD        Plan:   Return to clinic in 3-4 months.  Labs for next visit include rp pth ua upc us.  Baseline creatinine is 1.4-1.8 since 2015.  Blood pressure is controlled on the current regimen.  Continue current medications as prescribed and reviewed.   Please have patient follow-up with your PCP or Endocrinology regarding the control and management of diabetes.  Please avoid or minimize all NSAIDS (ibuprofen, motrin, aleve, indocin, naprosyn) to minimize the risk to your kidneys.  Aspirin in a dose of 325 mg or less a day is likely the safest with regards to kidney function.  If you are able to take aspirin and do not have any bleeding problems or ulcers, this may be your best therapy.  Alternatively, acetaminophen (Tylenol) is the safer than NSAIDs with regards to kidney function.  I would ask you take this as directed on the bottle.  It is best to stay under 2 grams a day (4-500 mg tablets a day maximum).

## 2022-05-31 ENCOUNTER — TELEPHONE (OUTPATIENT)
Dept: NEPHROLOGY | Facility: CLINIC | Age: 82
End: 2022-05-31
Payer: MEDICARE

## 2022-06-01 ENCOUNTER — TELEPHONE (OUTPATIENT)
Dept: PAIN MEDICINE | Facility: CLINIC | Age: 82
End: 2022-06-01
Payer: MEDICARE

## 2022-06-01 NOTE — TELEPHONE ENCOUNTER
Please the patient know that I reviewed his lumbar spine MRI and it shows significant narrowing off to the right side at L4/5 and L3/4 where his nerves are being pinched and I think this may be causing his right hip and leg pain.  My recommendation would be to set him up a right L3/4 and L4/5 TF ALINA.  He would need to be able to hold Xarelto, I would be okay with him continuing aspirin

## 2022-06-02 NOTE — TELEPHONE ENCOUNTER
Call placed to Pt to advise per Dr. Kent, he has  reviewed his lumbar spine MRI and it shows significant narrowing off to the right side at L4/5 and L3/4 where his nerves are being pinched and I think this may be causing his right hip and leg pain.  My recommendation would be to set him up a right L3/4 and L4/5 TF ALINA.  He would need to be able to hold Xarelto, I would be okay with him continuing aspirin.     No answer. V/M left.

## 2022-06-08 ENCOUNTER — TELEPHONE (OUTPATIENT)
Dept: PAIN MEDICINE | Facility: CLINIC | Age: 82
End: 2022-06-08
Payer: MEDICARE

## 2022-06-08 NOTE — TELEPHONE ENCOUNTER
Second call placed to Pt to advise per Dr. Kent, he has  reviewed his lumbar spine MRI and it shows significant narrowing off to the right side at L4/5 and L3/4 where his nerves are being pinched and I think this may be causing his right hip and leg pain.  His  recommendatioStatesn would be to set him up a right L3/4 and L4/5 TFESI.  Pt would need to be able to hold Xarelto, and Dr. Kent  would be okay with him continuing aspirin.    Pt verbalized understanding but would like to have time to think about it. States he will call the office back with his decision.

## 2022-07-19 ENCOUNTER — TELEPHONE (OUTPATIENT)
Dept: PAIN MEDICINE | Facility: CLINIC | Age: 82
End: 2022-07-19
Payer: MEDICARE

## 2022-07-19 NOTE — TELEPHONE ENCOUNTER
Call returned to Pt to set him up a right L3/4 and L4/5 TFESI per . Pt would need to be able to hold Xarelto, and Dr. Kent  would be okay with him continuing aspirin. No answer. Voicemail left.

## 2022-07-25 ENCOUNTER — TELEPHONE (OUTPATIENT)
Dept: PAIN MEDICINE | Facility: CLINIC | Age: 82
End: 2022-07-25
Payer: MEDICARE

## 2022-07-25 NOTE — TELEPHONE ENCOUNTER
Call returned to Pt to set him up a right L3/4 and L4/5 TFESI per . Pt would need to be able to hold Xarelto, and Dr. Kent  would be okay with him continuing aspirin, No answer. Voicemail left.

## 2022-07-25 NOTE — TELEPHONE ENCOUNTER
----- Message from Janene Samayoa sent at 7/25/2022 12:04 PM CDT -----  Type:  Patient Returning Call    Who Called:  patient   Who Left Message for Patient:  Nba  Does the patient know what this is regarding?:  yes  Best Call Back Number: [phone]   Additional Information:  please advise-thank you

## 2022-07-26 ENCOUNTER — TELEPHONE (OUTPATIENT)
Dept: PAIN MEDICINE | Facility: CLINIC | Age: 82
End: 2022-07-26
Payer: MEDICARE

## 2022-07-28 ENCOUNTER — TELEPHONE (OUTPATIENT)
Dept: PAIN MEDICINE | Facility: CLINIC | Age: 82
End: 2022-07-28
Payer: MEDICARE

## 2022-07-28 NOTE — TELEPHONE ENCOUNTER
Called patient to explain that we have been trying to call him to schedule an injection. He would like to come into clinic tomorrow to discuss the procedure and schedule an injection. I advised that the next available appointment for a procedure currently is 8/15 and he said he will not be in town and would like to have something sooner.

## 2022-07-29 ENCOUNTER — OFFICE VISIT (OUTPATIENT)
Dept: PAIN MEDICINE | Facility: CLINIC | Age: 82
End: 2022-07-29
Payer: MEDICARE

## 2022-07-29 VITALS
SYSTOLIC BLOOD PRESSURE: 190 MMHG | BODY MASS INDEX: 25.54 KG/M2 | DIASTOLIC BLOOD PRESSURE: 81 MMHG | HEIGHT: 71 IN | WEIGHT: 182.44 LBS | HEART RATE: 56 BPM | OXYGEN SATURATION: 99 %

## 2022-07-29 DIAGNOSIS — M54.16 LUMBAR RADICULOPATHY: Primary | ICD-10-CM

## 2022-07-29 DIAGNOSIS — M54.16 RIGHT LUMBAR RADICULOPATHY: ICD-10-CM

## 2022-07-29 DIAGNOSIS — M47.816 LUMBAR SPONDYLOSIS: ICD-10-CM

## 2022-07-29 PROCEDURE — 99999 PR PBB SHADOW E&M-EST. PATIENT-LVL V: ICD-10-PCS | Mod: PBBFAC,,,

## 2022-07-29 PROCEDURE — 99215 OFFICE O/P EST HI 40 MIN: CPT | Mod: PBBFAC,PN

## 2022-07-29 PROCEDURE — 99214 OFFICE O/P EST MOD 30 MIN: CPT | Mod: S$PBB,,,

## 2022-07-29 PROCEDURE — 99214 PR OFFICE/OUTPT VISIT, EST, LEVL IV, 30-39 MIN: ICD-10-PCS | Mod: S$PBB,,,

## 2022-07-29 PROCEDURE — 99999 PR PBB SHADOW E&M-EST. PATIENT-LVL V: CPT | Mod: PBBFAC,,,

## 2022-07-29 RX ORDER — ALPRAZOLAM 0.5 MG/1
1 TABLET, ORALLY DISINTEGRATING ORAL ONCE AS NEEDED
Status: CANCELLED | OUTPATIENT
Start: 2022-08-22 | End: 2034-01-17

## 2022-07-29 NOTE — PROGRESS NOTES
This note was completed with dictation software and grammatical errors may exist.    Chief Complaint   Patient presents with    Back Pain      HPI: Kuldip Espinal is a 81 y.o. year old male patient who has a past medical history of a CAD With Stenting, a Chronic Anticoagulation With Xarelto, a Paroxysmal Atrial Fibrillation, b Hypertension, b Microalbuminuria, b Stage 3 CKD.  He presents for follow-up with continued lower back pain.  He reports his pain starts in his right-side lower back and radiates down the side of his right leg stopping at his knee.  He reports the pain is worsened with being seated for a long time such as driving extended distances.  The pain is alleviated with standing, stretching lying down to rest.  He finds some relief with Tylenol and ibuprofen.  He has done physical therapy in the past the did not provide him with significant relief he actually states it worsened the pain.  He denies any associated numbness, maggie weakness or changes with his bowel bladder function.  He continues to have some left shoulder pain but is currently tolerable.  This shoulder pain originated from falling after his dog pulled him down.  He continues to take gabapentin 100 mg twice daily with no significant relief.    Prior HPI: Kuldip Espinal is a 81 y.o. year old male patient who has a past medical history of a CAD With Stenting, a Chronic Anticoagulation With Xarelto, a Paroxysmal Atrial Fibrillation, b Hypertension, b Microalbuminuria, b Stage 3 CKD, c Hypercholesterolemia With Low HDL, c Hypertriglyceridemia, d Type 2 Diabetes Mellitus, i Dyspnea On Exertion, i Pulmonary Interstitial Fibrosis On 03/2015 Chest CT, j Asymptomatic Cholelithiasis, j Colon CA S/P Partial Colectomy 2008, j Diverticulosis, k Benign Prostate Hypertrophy, k Bilateral Renal Cysts, k Elevated PSA Levels, l GEORGES positive, l Bilateral Hand Swelling And Pain, l Bilateral Thumb MCP Joint Osteoarthritis, l Chronic left shoulder pain, l  Chronic Lower Back Pain, l H/O Left TKR 02/2013, l Lumbar Spinal DDD, l Thoracic Spinal DDD, n Situational Anxiety And Depression, q Chronic Eczema, q Recurrent Urticaria Vasculitis, q Toenail Onychomycosis, q Vitamin D deficiency, and Wellness Visit 4/22/2022. He presents in referral from No ref. provider found for back pain.  He reports having back pain and right hip pain for over a year.  He states that the pain is  Located in his right lateral thigh and low back.  Is worse with sitting for a long time, longer he sits, if he cannot extend his leg pain worsens.  It also hurts with walking.  The pain is across the right side of his back and right lateral thigh, does get some relief with lying down and rest.  Also gets some relief with Tylenol and ibuprofen.  He tried physical therapy for about 4 weeks and this did not seem to improve it, actually aggravated the pain.  He denies any maggie weakness or numbness.        Pain intervention history:  He had a right GTB injection with no major improvement.    Spine surgeries:    Antineuropathics: Gabapentin 100mg   Just in the morning, no side effects but no benefits just yet  NSAIDs:  Physical therapy:Did greater than 4 weeks of physical therapy at [place] several months ago, no benefit with this.  Antidepressants:  Muscle relaxers:  Opioids:  Antiplatelets/Anticoagulants: Xarelto 15mg        ROS:  He reports cough and back pain.  Balance of review of systems is negative.    Lab Results   Component Value Date    LABA1C 5.4 05/22/2018    HGBA1C 6.0 (H) 04/21/2022       Lab Results   Component Value Date    WBC 8.7 04/21/2022    HGB 15.5 04/21/2022    HCT 46.4 04/21/2022    MCV 91.7 04/21/2022     04/21/2022             Past Medical History:   Diagnosis Date    a CAD With Stenting     Dr. Eleuterio Knott [facility]    a Chronic Anticoagulation With Xarelto ###    Dr. Eleuterio Knott [facility]    a Paroxysmal Atrial Fibrillation ###    Dr. Eleuterio Knott At [facility]    b  Hypertension     Norvasc 5 mg D/Cd On 8/28/15    b Microalbuminuria     On Ramipril 10 Mg BID    b Stage 3 CKD ###    4/22/22 Referred To Dr. Leandro Live; He Used To See Dr. Kuldip hollis Hypercholesterolemia With Low HDL     On Crestor 20 Mg qHS    c Hypertriglyceridemia     On Lovaza 2 Gm Bid    d Type 2 Diabetes Mellitus     12/5/17 RXd Trulicity 0.75 Mg SQ Weekly; On Amary 2 Mg Daily; D/Cd Glucotrol 10 Mg Bid; 11/22/16 Added Januvia 50 Mg Daily To His Glucotrol 10 Mg Bid; He Has Had Full ADA Training With Bree Muhammad i Dyspnea On Exertion ###    i Pulmonary Interstitial Fibrosis On 03/2015 Chest CT ###    9/9/20 Referred To Dr. Colin benavidez Asymptomatic Cholelithiasis ###    2/4/19 CT Urogram (D.I.S.) = Report Scanned    j Colon CA S/P Partial Colectomy 2008     Dr. Hayes benavidez Diverticulosis     Dr. Hayes martinez Benign Prostate Hypertrophy     Dr. Vernon Solano; Dr. Norris Florian Retired; On Cialis 5 Mg Daily For This    k Bilateral Renal Cysts     Dr. Vernon Solano; 2/4/19 CT Urogram (D.I.S.) = Report Scanned    k Elevated PSA Levels ###    Dr. Vernon Solano; Dr. Norrsi Florian Retired; I Always Send Lab Results To Dr. Russ art GEORGES positive     Dr. Adeola Sarkar; 6/3/19 RF, UA Level, And ESR = Normal    l Bilateral Hand Swelling And Pain     2/22/19 Bilateral Hand XRays = (See Report)    l Bilateral Thumb MCP Joint Osteoarthritis     2/22/19 Bilateral Hand XRays = (See Report)    l Chronic left shoulder pain     Dr. Heike art Chronic Lower Back Pain     l H/O Left TKR 02/2013     Dr. Heike art Lumbar Spinal DDD     4/22/22 Referred To Dr. Santos art Thoracic Spinal DDD     On 03/2015 Chest CT     n Situational Anxiety And Depression     RTC 6 Weeks; RXd Wellbutrin  Mg qAM; Lexapro Also Worked Well    q Chronic Eczema     9/5/19 Referred To Dr. Behzad King; Dr. Adeola Sarkar; Dr. Erna winkler Recurrent Urticaria  "Vasculitis ########    Dr. Adeola Sarkar    q Toenail Onychomycosis     3/5/20 Referred To Dr. Wolfgang Cruz; 7/17/15 RXd Lamisil 250 Mg Daily X 3 Months    q Vitamin D deficiency     9/9/20 RXd OTC D3 5K IU daily; 5/28/18 Increased OTC D3 5K IU Daily To 10K IU Daily    Wellness Visit 4/22/2022        No past surgical history on file.    Social History     Socioeconomic History    Marital status:    Tobacco Use    Smoking status: Former Smoker    Smokeless tobacco: Never Used         Medications/Allergies: See med card    Vitals:    07/29/22 1032   BP: (!) 200/79   Pulse: (!) 48   SpO2: 98%   Weight: 82.7 kg (182 lb 6.9 oz)   Height: 5' 11" (1.803 m)   PainSc:   6   PainLoc: Back     Body mass index is 25.44 kg/m².    Physical exam:    Gen: A and O x3, pleasant, well-groomed  Skin: No rashes or obvious lesions  HEENT: PERRLA, no obvious deformities on ears or in canals. Trachea midline.  CVS: Regular rate and rhythm, normal palpable pulses.  Resp:No increased work of breathing, symmetrical chest rise.  Abdomen: Soft, NT/ND.  Musculoskeletal: No antalgic gait.       Neuro:    Iliopsoas Quadriceps Knee  Flexion Tibialis  anterior Gastro- cnemius EHL   Lower: R 5/5 5/5 5/5 5/5 5/5 5/5    L 5/5 5/5 5/5 5/5 5/5 5/5      Left  Right    Patellar DTR 0+ 1+   Achilles DTR 0+ 0+   Khan Absent  Absent   Clonus Absent Absent   Babinski Absent Absent     Intact and symmetrical to light touch and pinprick in L1-S1 dermatomes bilaterally.    Lumbar spine:  Lumbar spine: ROM is  Moderately reduced with flexion and extension and oblique extension with no major increased pain on any the new hers  Deshaun's test causes no increased pain on either side.    Supine straight leg raise is negative bilaterally.    Internal and external rotation of the hip causes no increased pain on either side.  Myofascial exam: No tenderness to palpation across lumbar paraspinous muscles. There is tenderness to palpation over the mid and " distal lateral thigh but no pain over the greater trochanter itself.    Imagin21 Xray right pelvis/hip:  No fracture is identified.  The femoral head appears to have a normal relation with the acetabulum.  There are vascular calcifications present.  Degenerative changes are noted in the visualized portion of the lower lumbar spine.    2022 MRI L-spine  FINDINGS:  Vertebral column: There is extensive multilevel degenerative change which will be described by level.  There is a moderate levocurvature of the mid and lower lumbar spine and mild dextrocurvature of the upper lumbar spine as seen on the  image.  There is marked disc space narrowing from T12-L1 through the L4-5 levels with moderate disc space narrowing at the L5-S1 level.  This has progressed particularly at the L3-4 level where there is now Modic type 1 degenerative endplate signal change.  All of the discs are desiccated.  Vertebral body height is preserved.  There is no fracture.  There is trace retrolisthesis of L1 on L2, L2 on L3 (exaggerated by osteophyte formation) and trace anterolisthesis of L5 on S1. this was present previously.     Spinal canal, conus, epidural space: The spinal canal is likely developmentally normal.  The conus terminates approximately at the level of superior L1 and is grossly normal in signal intensity.     Findings by level:     On the sagittal images, there is disc space narrowing at the T9-10 and T10-11 levels.  There is also facet joint arthropathy with ligamentum flavum thickening.  There is mild spinal stenosis at the T10-11 level.  There is no cord compression.  There is also moderate right foraminal stenosis at the T10-11 level.  These findings however are incompletely evaluated.     T11-12: There is moderate disc space narrowing.  There is facet joint arthropathy with ligamentum flavum thickening, left greater than right.  There is mild-to-moderate spinal stenosis and bilateral foraminal stenosis.   Axial images were not obtained through this level on the prior study but the findings do appear to have progressed.     T12-L1: There is marked disc space narrowing.  There is a diffuse disc bulge with osteophytic ridging and superimposed broad central left paracentral disc protrusion.  There is moderate facet joint arthropathy.  There is moderate spinal stenosis with severe left lateral recess and foraminal stenosis as well as moderate right foraminal stenosis.  The findings have progressed.     L1-2: There is marked disc space narrowing, trace retrolisthesis of L1 on L2.  There is a diffuse disc bulge with osteophytic ridging and superimposed broad central and right paracentral disc protrusion/osteophyte.  Previously, there is a very large central left paracentral disc extrusion with caudad extension.  There is facet joint arthropathy.  There is moderate spinal stenosis with severe right and moderate left lateral recess stenosis as well as severe bilateral foraminal stenosis.  L2-3: There is marked disc space narrowing.  There is severe right and moderate left facet joint arthropathy with ligamentum flavum thickening.  There is trace retrolisthesis of L2 on L3.  There is a diffuse disc bulge with osteophytic ridging.  There is moderate to severe spinal stenosis with severe right and moderate left lateral recess stenosis.  There is severe bilateral foraminal stenosis.  The findings have progressed.  L3-4: There is marked disc space narrowing.  There is severe bilateral facet joint arthropathy with ligamentum flavum thickening.  There is a trace left facet joint effusion.  There is a moderate diffuse disc bulge.  There is severe spinal canal, bilateral lateral recess and right foraminal stenosis.  There is moderate left foraminal stenosis.  The findings have progressed.  L4-5: There is marked disc space narrowing.  There is anterior osteophyte formation with posterior disc extrusion with suspected calcification.   There is facet joint arthropathy.  There is no significant spinal stenosis but there is severe bilateral foraminal stenosis.  These findings were present previously  L5-S1: There is disc space narrowing with trace anterolisthesis of L5 on S1.  There is marked facet joint arthropathy there is no spinal stenosis.  There is moderate bilateral foraminal stenosis without significant change.     Impression:     1. There is extensive multilevel degenerative disc and facet disease.  The findings are discussed in detail by level above.  There is multilevel spinal canal, lateral recess and foraminal stenosis with interval progression at multiple levels.  2. There is no acute fracture.  3. Known multiple right renal cysts    Assessment:   Kuldip Espinal is a 81 y.o. year old male patient who has a past medical history of a CAD With Stenting, a Chronic Anticoagulation With Xarelto, a Paroxysmal Atrial Fibrillation, b Hypertension, b Microalbuminuria, b Stage 3 CKD, c Hypercholesterolemia With Low HDL, c Hypertriglyceridemia, d Type 2 Diabetes Mellitus, i Dyspnea On Exertion, i Pulmonary Interstitial Fibrosis On 03/2015 Chest CT, j Asymptomatic Cholelithiasis, j Colon CA S/P Partial Colectomy 2008, j Diverticulosis, k Benign Prostate Hypertrophy, k Bilateral Renal Cysts, k Elevated PSA Levels, l GEORGES positive, l Bilateral Hand Swelling And Pain, l Bilateral Thumb MCP Joint Osteoarthritis, l Chronic left shoulder pain, l Chronic Lower Back Pain, l H/O Left TKR 02/2013, l Lumbar Spinal DDD, l Thoracic Spinal DDD, n Situational Anxiety And Depression, q Chronic Eczema, q Recurrent Urticaria Vasculitis, q Toenail Onychomycosis, q Vitamin D deficiency, and Wellness Visit 4/22/2022. He presents in referral from Dr. Evens Mcpherson for back pain.    1. Lumbar radiculopathy  Vital signs    Verify informed consent    Notify physician     Notify physician     Notify physician (specify)    Diet NPO    Case Request Operating Room:  Injection,steroid,epidural,transforaminal approach L3/4 and L4/5    Place in Outpatient    alprazolam ODT dissolvable tablet 1 mg   2. Lumbar spondylosis     3. Right lumbar radiculopathy         Plan:  1. Today we discussed pain and symptoms.  We went over his lumbar MRI is significant for  L3-4: There is severe spinal canal, bilateral lateral recess and right foraminal stenosis. L4-5: severe bilateral foraminal stenosis.   2. His pain is limiting his mobility interfering with his ADLs.  3. Will schedule him for a right-sided L3/4 and L4/5 transforaminal ALINA to better target his right-sided radicular pain.  4. He is on Xarelto and will need approval to hold this. Follow-up 4 weeks post procedure.

## 2022-07-29 NOTE — H&P (VIEW-ONLY)
This note was completed with dictation software and grammatical errors may exist.    Chief Complaint   Patient presents with    Back Pain      HPI: Kuldip Espinal is a 81 y.o. year old male patient who has a past medical history of a CAD With Stenting, a Chronic Anticoagulation With Xarelto, a Paroxysmal Atrial Fibrillation, b Hypertension, b Microalbuminuria, b Stage 3 CKD.  He presents for follow-up with continued lower back pain.  He reports his pain starts in his right-side lower back and radiates down the side of his right leg stopping at his knee.  He reports the pain is worsened with being seated for a long time such as driving extended distances.  The pain is alleviated with standing, stretching lying down to rest.  He finds some relief with Tylenol and ibuprofen.  He has done physical therapy in the past the did not provide him with significant relief he actually states it worsened the pain.  He denies any associated numbness, maggie weakness or changes with his bowel bladder function.  He continues to have some left shoulder pain but is currently tolerable.  This shoulder pain originated from falling after his dog pulled him down.  He continues to take gabapentin 100 mg twice daily with no significant relief.    Prior HPI: Kuldip Espinal is a 81 y.o. year old male patient who has a past medical history of a CAD With Stenting, a Chronic Anticoagulation With Xarelto, a Paroxysmal Atrial Fibrillation, b Hypertension, b Microalbuminuria, b Stage 3 CKD, c Hypercholesterolemia With Low HDL, c Hypertriglyceridemia, d Type 2 Diabetes Mellitus, i Dyspnea On Exertion, i Pulmonary Interstitial Fibrosis On 03/2015 Chest CT, j Asymptomatic Cholelithiasis, j Colon CA S/P Partial Colectomy 2008, j Diverticulosis, k Benign Prostate Hypertrophy, k Bilateral Renal Cysts, k Elevated PSA Levels, l GEORGES positive, l Bilateral Hand Swelling And Pain, l Bilateral Thumb MCP Joint Osteoarthritis, l Chronic left shoulder pain, l  Chronic Lower Back Pain, l H/O Left TKR 02/2013, l Lumbar Spinal DDD, l Thoracic Spinal DDD, n Situational Anxiety And Depression, q Chronic Eczema, q Recurrent Urticaria Vasculitis, q Toenail Onychomycosis, q Vitamin D deficiency, and Wellness Visit 4/22/2022. He presents in referral from No ref. provider found for back pain.  He reports having back pain and right hip pain for over a year.  He states that the pain is  Located in his right lateral thigh and low back.  Is worse with sitting for a long time, longer he sits, if he cannot extend his leg pain worsens.  It also hurts with walking.  The pain is across the right side of his back and right lateral thigh, does get some relief with lying down and rest.  Also gets some relief with Tylenol and ibuprofen.  He tried physical therapy for about 4 weeks and this did not seem to improve it, actually aggravated the pain.  He denies any maggie weakness or numbness.        Pain intervention history:  He had a right GTB injection with no major improvement.    Spine surgeries:    Antineuropathics: Gabapentin 100mg   Just in the morning, no side effects but no benefits just yet  NSAIDs:  Physical therapy:Did greater than 4 weeks of physical therapy at [place] several months ago, no benefit with this.  Antidepressants:  Muscle relaxers:  Opioids:  Antiplatelets/Anticoagulants: Xarelto 15mg        ROS:  He reports cough and back pain.  Balance of review of systems is negative.    Lab Results   Component Value Date    LABA1C 5.4 05/22/2018    HGBA1C 6.0 (H) 04/21/2022       Lab Results   Component Value Date    WBC 8.7 04/21/2022    HGB 15.5 04/21/2022    HCT 46.4 04/21/2022    MCV 91.7 04/21/2022     04/21/2022             Past Medical History:   Diagnosis Date    a CAD With Stenting     Dr. Eleuterio Knott At [facility]    a Chronic Anticoagulation With Xarelto ###    Dr. Eleuterio Knott At [facility]    a Paroxysmal Atrial Fibrillation ###    Dr. Eleuterio Knott At [facility]    b  Hypertension     Norvasc 5 mg D/Cd On 8/28/15    b Microalbuminuria     On Ramipril 10 Mg BID    b Stage 3 CKD ###    4/22/22 Referred To Dr. Leandro Live; He Used To See Dr. Kuldip hollis Hypercholesterolemia With Low HDL     On Crestor 20 Mg qHS    c Hypertriglyceridemia     On Lovaza 2 Gm Bid    d Type 2 Diabetes Mellitus     12/5/17 RXd Trulicity 0.75 Mg SQ Weekly; On Amary 2 Mg Daily; D/Cd Glucotrol 10 Mg Bid; 11/22/16 Added Januvia 50 Mg Daily To His Glucotrol 10 Mg Bid; He Has Had Full ADA Training With Bree Muhammad i Dyspnea On Exertion ###    i Pulmonary Interstitial Fibrosis On 03/2015 Chest CT ###    9/9/20 Referred To Dr. Colin benavidez Asymptomatic Cholelithiasis ###    2/4/19 CT Urogram (D.I.S.) = Report Scanned    j Colon CA S/P Partial Colectomy 2008     Dr. Hayes benavidez Diverticulosis     Dr. Hayes martinez Benign Prostate Hypertrophy     Dr. Vernon Solano; Dr. Norris Florian Retired; On Cialis 5 Mg Daily For This    k Bilateral Renal Cysts     Dr. Vernon Solano; 2/4/19 CT Urogram (D.I.S.) = Report Scanned    k Elevated PSA Levels ###    Dr. Vernon Solano; Dr. Norris Florian Retired; I Always Send Lab Results To Dr. Russ art GEORGES positive     Dr. Adeola Sarkar; 6/3/19 RF, UA Level, And ESR = Normal    l Bilateral Hand Swelling And Pain     2/22/19 Bilateral Hand XRays = (See Report)    l Bilateral Thumb MCP Joint Osteoarthritis     2/22/19 Bilateral Hand XRays = (See Report)    l Chronic left shoulder pain     Dr. Heike art Chronic Lower Back Pain     l H/O Left TKR 02/2013     Dr. Heike art Lumbar Spinal DDD     4/22/22 Referred To Dr. Santos art Thoracic Spinal DDD     On 03/2015 Chest CT     n Situational Anxiety And Depression     RTC 6 Weeks; RXd Wellbutrin  Mg qAM; Lexapro Also Worked Well    q Chronic Eczema     9/5/19 Referred To Dr. Behzad King; Dr. Adeola Sarkar; Dr. Erna winkler Recurrent Urticaria  "Vasculitis ########    Dr. Adeola Sarkar    q Toenail Onychomycosis     3/5/20 Referred To Dr. Wolfgang Cruz; 7/17/15 RXd Lamisil 250 Mg Daily X 3 Months    q Vitamin D deficiency     9/9/20 RXd OTC D3 5K IU daily; 5/28/18 Increased OTC D3 5K IU Daily To 10K IU Daily    Wellness Visit 4/22/2022        No past surgical history on file.    Social History     Socioeconomic History    Marital status:    Tobacco Use    Smoking status: Former Smoker    Smokeless tobacco: Never Used         Medications/Allergies: See med card    Vitals:    07/29/22 1032   BP: (!) 200/79   Pulse: (!) 48   SpO2: 98%   Weight: 82.7 kg (182 lb 6.9 oz)   Height: 5' 11" (1.803 m)   PainSc:   6   PainLoc: Back     Body mass index is 25.44 kg/m².    Physical exam:    Gen: A and O x3, pleasant, well-groomed  Skin: No rashes or obvious lesions  HEENT: PERRLA, no obvious deformities on ears or in canals. Trachea midline.  CVS: Regular rate and rhythm, normal palpable pulses.  Resp:No increased work of breathing, symmetrical chest rise.  Abdomen: Soft, NT/ND.  Musculoskeletal: No antalgic gait.       Neuro:    Iliopsoas Quadriceps Knee  Flexion Tibialis  anterior Gastro- cnemius EHL   Lower: R 5/5 5/5 5/5 5/5 5/5 5/5    L 5/5 5/5 5/5 5/5 5/5 5/5      Left  Right    Patellar DTR 0+ 1+   Achilles DTR 0+ 0+   Khan Absent  Absent   Clonus Absent Absent   Babinski Absent Absent     Intact and symmetrical to light touch and pinprick in L1-S1 dermatomes bilaterally.    Lumbar spine:  Lumbar spine: ROM is  Moderately reduced with flexion and extension and oblique extension with no major increased pain on any the new hers  Deshaun's test causes no increased pain on either side.    Supine straight leg raise is negative bilaterally.    Internal and external rotation of the hip causes no increased pain on either side.  Myofascial exam: No tenderness to palpation across lumbar paraspinous muscles. There is tenderness to palpation over the mid and " distal lateral thigh but no pain over the greater trochanter itself.    Imagin21 Xray right pelvis/hip:  No fracture is identified.  The femoral head appears to have a normal relation with the acetabulum.  There are vascular calcifications present.  Degenerative changes are noted in the visualized portion of the lower lumbar spine.    2022 MRI L-spine  FINDINGS:  Vertebral column: There is extensive multilevel degenerative change which will be described by level.  There is a moderate levocurvature of the mid and lower lumbar spine and mild dextrocurvature of the upper lumbar spine as seen on the  image.  There is marked disc space narrowing from T12-L1 through the L4-5 levels with moderate disc space narrowing at the L5-S1 level.  This has progressed particularly at the L3-4 level where there is now Modic type 1 degenerative endplate signal change.  All of the discs are desiccated.  Vertebral body height is preserved.  There is no fracture.  There is trace retrolisthesis of L1 on L2, L2 on L3 (exaggerated by osteophyte formation) and trace anterolisthesis of L5 on S1. this was present previously.     Spinal canal, conus, epidural space: The spinal canal is likely developmentally normal.  The conus terminates approximately at the level of superior L1 and is grossly normal in signal intensity.     Findings by level:     On the sagittal images, there is disc space narrowing at the T9-10 and T10-11 levels.  There is also facet joint arthropathy with ligamentum flavum thickening.  There is mild spinal stenosis at the T10-11 level.  There is no cord compression.  There is also moderate right foraminal stenosis at the T10-11 level.  These findings however are incompletely evaluated.     T11-12: There is moderate disc space narrowing.  There is facet joint arthropathy with ligamentum flavum thickening, left greater than right.  There is mild-to-moderate spinal stenosis and bilateral foraminal stenosis.   Axial images were not obtained through this level on the prior study but the findings do appear to have progressed.     T12-L1: There is marked disc space narrowing.  There is a diffuse disc bulge with osteophytic ridging and superimposed broad central left paracentral disc protrusion.  There is moderate facet joint arthropathy.  There is moderate spinal stenosis with severe left lateral recess and foraminal stenosis as well as moderate right foraminal stenosis.  The findings have progressed.     L1-2: There is marked disc space narrowing, trace retrolisthesis of L1 on L2.  There is a diffuse disc bulge with osteophytic ridging and superimposed broad central and right paracentral disc protrusion/osteophyte.  Previously, there is a very large central left paracentral disc extrusion with caudad extension.  There is facet joint arthropathy.  There is moderate spinal stenosis with severe right and moderate left lateral recess stenosis as well as severe bilateral foraminal stenosis.  L2-3: There is marked disc space narrowing.  There is severe right and moderate left facet joint arthropathy with ligamentum flavum thickening.  There is trace retrolisthesis of L2 on L3.  There is a diffuse disc bulge with osteophytic ridging.  There is moderate to severe spinal stenosis with severe right and moderate left lateral recess stenosis.  There is severe bilateral foraminal stenosis.  The findings have progressed.  L3-4: There is marked disc space narrowing.  There is severe bilateral facet joint arthropathy with ligamentum flavum thickening.  There is a trace left facet joint effusion.  There is a moderate diffuse disc bulge.  There is severe spinal canal, bilateral lateral recess and right foraminal stenosis.  There is moderate left foraminal stenosis.  The findings have progressed.  L4-5: There is marked disc space narrowing.  There is anterior osteophyte formation with posterior disc extrusion with suspected calcification.   There is facet joint arthropathy.  There is no significant spinal stenosis but there is severe bilateral foraminal stenosis.  These findings were present previously  L5-S1: There is disc space narrowing with trace anterolisthesis of L5 on S1.  There is marked facet joint arthropathy there is no spinal stenosis.  There is moderate bilateral foraminal stenosis without significant change.     Impression:     1. There is extensive multilevel degenerative disc and facet disease.  The findings are discussed in detail by level above.  There is multilevel spinal canal, lateral recess and foraminal stenosis with interval progression at multiple levels.  2. There is no acute fracture.  3. Known multiple right renal cysts    Assessment:   Kuldip Espinal is a 81 y.o. year old male patient who has a past medical history of a CAD With Stenting, a Chronic Anticoagulation With Xarelto, a Paroxysmal Atrial Fibrillation, b Hypertension, b Microalbuminuria, b Stage 3 CKD, c Hypercholesterolemia With Low HDL, c Hypertriglyceridemia, d Type 2 Diabetes Mellitus, i Dyspnea On Exertion, i Pulmonary Interstitial Fibrosis On 03/2015 Chest CT, j Asymptomatic Cholelithiasis, j Colon CA S/P Partial Colectomy 2008, j Diverticulosis, k Benign Prostate Hypertrophy, k Bilateral Renal Cysts, k Elevated PSA Levels, l GEORGES positive, l Bilateral Hand Swelling And Pain, l Bilateral Thumb MCP Joint Osteoarthritis, l Chronic left shoulder pain, l Chronic Lower Back Pain, l H/O Left TKR 02/2013, l Lumbar Spinal DDD, l Thoracic Spinal DDD, n Situational Anxiety And Depression, q Chronic Eczema, q Recurrent Urticaria Vasculitis, q Toenail Onychomycosis, q Vitamin D deficiency, and Wellness Visit 4/22/2022. He presents in referral from Dr. Evens Mcpherson for back pain.    1. Lumbar radiculopathy  Vital signs    Verify informed consent    Notify physician     Notify physician     Notify physician (specify)    Diet NPO    Case Request Operating Room:  Injection,steroid,epidural,transforaminal approach L3/4 and L4/5    Place in Outpatient    alprazolam ODT dissolvable tablet 1 mg   2. Lumbar spondylosis     3. Right lumbar radiculopathy         Plan:  1. Today we discussed pain and symptoms.  We went over his lumbar MRI is significant for  L3-4: There is severe spinal canal, bilateral lateral recess and right foraminal stenosis. L4-5: severe bilateral foraminal stenosis.   2. His pain is limiting his mobility interfering with his ADLs.  3. Will schedule him for a right-sided L3/4 and L4/5 transforaminal ALINA to better target his right-sided radicular pain.  4. He is on Xarelto and will need approval to hold this. Follow-up 4 weeks post procedure.

## 2022-08-19 ENCOUNTER — TELEPHONE (OUTPATIENT)
Dept: SURGERY | Facility: HOSPITAL | Age: 82
End: 2022-08-19
Payer: MEDICARE

## 2022-08-19 ENCOUNTER — TELEPHONE (OUTPATIENT)
Dept: SURGERY | Facility: HOSPITAL | Age: 82
End: 2022-08-19

## 2022-08-19 NOTE — TELEPHONE ENCOUNTER
Good afternoon,     I just wanted to inform the clinic that I have been unable to reach Mr. Espinal to determine if he has stopped his Xarelto/Aspirin prior to his injection scheduled on Monday, 8/22.     Thank you.

## 2022-08-22 ENCOUNTER — HOSPITAL ENCOUNTER (OUTPATIENT)
Facility: HOSPITAL | Age: 82
Discharge: HOME | End: 2022-08-22
Attending: ANESTHESIOLOGY | Admitting: ANESTHESIOLOGY
Payer: MEDICARE

## 2022-08-22 ENCOUNTER — HOSPITAL ENCOUNTER (OUTPATIENT)
Dept: RADIOLOGY | Facility: HOSPITAL | Age: 82
Discharge: HOME | End: 2022-08-22
Attending: ANESTHESIOLOGY | Admitting: ANESTHESIOLOGY
Payer: MEDICARE

## 2022-08-22 VITALS
OXYGEN SATURATION: 96 % | TEMPERATURE: 99 F | HEART RATE: 59 BPM | RESPIRATION RATE: 17 BRPM | SYSTOLIC BLOOD PRESSURE: 129 MMHG | DIASTOLIC BLOOD PRESSURE: 88 MMHG

## 2022-08-22 DIAGNOSIS — M54.16 LUMBAR RADICULOPATHY: ICD-10-CM

## 2022-08-22 DIAGNOSIS — M51.36 DDD (DEGENERATIVE DISC DISEASE), LUMBAR: ICD-10-CM

## 2022-08-22 LAB — GLUCOSE SERPL-MCNC: 91 MG/DL (ref 70–110)

## 2022-08-22 PROCEDURE — 76000 FLUOROSCOPY <1 HR PHYS/QHP: CPT | Mod: TC,PO

## 2022-08-22 PROCEDURE — 64484 NJX AA&/STRD TFRM EPI L/S EA: CPT | Mod: PO | Performed by: ANESTHESIOLOGY

## 2022-08-22 PROCEDURE — 64484 NJX AA&/STRD TFRM EPI L/S EA: CPT | Mod: RT,,, | Performed by: ANESTHESIOLOGY

## 2022-08-22 PROCEDURE — 25000003 PHARM REV CODE 250: Mod: PO | Performed by: ANESTHESIOLOGY

## 2022-08-22 PROCEDURE — 63600175 PHARM REV CODE 636 W HCPCS: Mod: PO | Performed by: ANESTHESIOLOGY

## 2022-08-22 PROCEDURE — 64483 PR EPIDURAL INJ, ANES/STEROID, TRANSFORAMINAL, LUMB/SACR, SNGL LEVL: ICD-10-PCS | Mod: RT,,, | Performed by: ANESTHESIOLOGY

## 2022-08-22 PROCEDURE — 64483 NJX AA&/STRD TFRM EPI L/S 1: CPT | Mod: RT,,, | Performed by: ANESTHESIOLOGY

## 2022-08-22 PROCEDURE — 64484 PRA INJECT ANES/STEROID FORAMEN LUMBAR/SACRAL W IMG GUIDE ,EA ADD LEVEL: ICD-10-PCS | Mod: RT,,, | Performed by: ANESTHESIOLOGY

## 2022-08-22 PROCEDURE — 25500020 PHARM REV CODE 255: Mod: PO | Performed by: ANESTHESIOLOGY

## 2022-08-22 PROCEDURE — 64483 NJX AA&/STRD TFRM EPI L/S 1: CPT | Mod: PO | Performed by: ANESTHESIOLOGY

## 2022-08-22 RX ORDER — METHYLPREDNISOLONE ACETATE 80 MG/ML
INJECTION, SUSPENSION INTRA-ARTICULAR; INTRALESIONAL; INTRAMUSCULAR; SOFT TISSUE
Status: DISCONTINUED | OUTPATIENT
Start: 2022-08-22 | End: 2022-08-22 | Stop reason: HOSPADM

## 2022-08-22 RX ORDER — LIDOCAINE HYDROCHLORIDE 10 MG/ML
INJECTION, SOLUTION EPIDURAL; INFILTRATION; INTRACAUDAL; PERINEURAL
Status: DISCONTINUED | OUTPATIENT
Start: 2022-08-22 | End: 2022-08-22 | Stop reason: HOSPADM

## 2022-08-22 RX ORDER — BUPIVACAINE HYDROCHLORIDE 2.5 MG/ML
INJECTION, SOLUTION EPIDURAL; INFILTRATION; INTRACAUDAL
Status: DISCONTINUED | OUTPATIENT
Start: 2022-08-22 | End: 2022-08-22 | Stop reason: HOSPADM

## 2022-08-22 RX ORDER — ALPRAZOLAM 0.5 MG/1
1 TABLET, ORALLY DISINTEGRATING ORAL ONCE AS NEEDED
Status: COMPLETED | OUTPATIENT
Start: 2022-08-22 | End: 2022-08-22

## 2022-08-22 RX ADMIN — ALPRAZOLAM 1 MG: 0.5 TABLET, ORALLY DISINTEGRATING ORAL at 03:08

## 2022-08-22 NOTE — OP NOTE
PROCEDURE DATE: 8/22/2022    PROCEDURE: Right L3/4 and L4/5 transforaminal epidural steroid injection under fluoroscopy    DIAGNOSIS: Lumbar  Radiculopathy    Post op diagnosis: Same    PHYSICIAN: Santos Kent MD    MEDICATIONS INJECTED:  Methylprednisolone 40mg (1ml) and 1ml 0.25% bupivicaine at each nerve root.     LOCAL ANESTHETIC INJECTED:  Lidocaine 1%. 4 ml per site.    SEDATION MEDICATIONS: none    ESTIMATED BLOOD LOSS:  none    COMPLICATIONS:  none    TECHNIQUE:   A time-out was taken to identify patient and procedure side prior to starting the procedure. The patient was placed in a prone position, prepped and draped in the usual sterile fashion using ChloraPrep and sterile towels.  The area to be injected was determined under fluoroscopic guidance in AP and oblique view.  Local anesthetic was given by raising a wheal and going down to the hub of a 25-gauge 1.5 inch needle.  In oblique view, a 3.5 inch 22-gauge bent-tip spinal needle was introduced towards 6 oclock position of the pedicle of each above named nerve root level.  The needle was walked medially then hinged into the neural foramen and position was confirmed in AP and lateral views.  Omnipaque contrast dye was injected to confirm appropriate placement and that there was no vascular uptake.  After negative aspiration for blood or CSF, the medication was then injected. This was performed at the right L3/4 and L4/5 level(s). The patient tolerated the procedure well.    This was a technically difficult procedure due to the hypertrophic facet joints.    The patient was monitored after the procedure.  Patient was given post procedure and discharge instructions to follow at home. The patient was discharged in a stable condition.

## 2022-08-22 NOTE — DISCHARGE SUMMARY
[facility] - Surgery  Discharge Note  Short Stay    Procedure(s) (LRB):  Injection,steroid,epidural,transforaminal approach L3/4 and L4/5 (Right)    OUTCOME: Patient tolerated treatment/procedure well without complication and is now ready for discharge.    DISPOSITION: Home or Self Care    FINAL DIAGNOSIS:  Lumbar radiculopathy    FOLLOWUP: In clinic    DISCHARGE INSTRUCTIONS:    Discharge Procedure Orders   Diet Adult Regular     No dressing needed     Notify your health care provider if you experience any of the following:  temperature >100.4     Activity as tolerated

## 2022-09-12 ENCOUNTER — OFFICE VISIT (OUTPATIENT)
Dept: PAIN MEDICINE | Facility: CLINIC | Age: 82
End: 2022-09-12
Payer: MEDICARE

## 2022-09-12 VITALS
HEART RATE: 81 BPM | BODY MASS INDEX: 24.63 KG/M2 | OXYGEN SATURATION: 98 % | HEIGHT: 71 IN | SYSTOLIC BLOOD PRESSURE: 133 MMHG | DIASTOLIC BLOOD PRESSURE: 77 MMHG | WEIGHT: 175.94 LBS

## 2022-09-12 DIAGNOSIS — M47.816 LUMBAR SPONDYLOSIS: ICD-10-CM

## 2022-09-12 DIAGNOSIS — M25.512 LEFT SHOULDER PAIN, UNSPECIFIED CHRONICITY: ICD-10-CM

## 2022-09-12 DIAGNOSIS — M54.16 LUMBAR RADICULOPATHY: Primary | ICD-10-CM

## 2022-09-12 PROCEDURE — 99213 OFFICE O/P EST LOW 20 MIN: CPT | Mod: PBBFAC,PN

## 2022-09-12 PROCEDURE — 99999 PR PBB SHADOW E&M-EST. PATIENT-LVL III: ICD-10-PCS | Mod: PBBFAC,,,

## 2022-09-12 PROCEDURE — 99213 PR OFFICE/OUTPT VISIT, EST, LEVL III, 20-29 MIN: ICD-10-PCS | Mod: S$PBB,,,

## 2022-09-12 PROCEDURE — 99999 PR PBB SHADOW E&M-EST. PATIENT-LVL III: CPT | Mod: PBBFAC,,,

## 2022-09-12 PROCEDURE — 99213 OFFICE O/P EST LOW 20 MIN: CPT | Mod: S$PBB,,,

## 2022-09-12 RX ORDER — LANOLIN ALCOHOL/MO/W.PET/CERES
1000 CREAM (GRAM) TOPICAL DAILY
COMMUNITY

## 2022-09-12 RX ORDER — TRIAMCINOLONE ACETONIDE 1 MG/G
1 CREAM TOPICAL 2 TIMES DAILY PRN
COMMUNITY
Start: 2022-07-25

## 2022-09-12 NOTE — PROGRESS NOTES
This note was completed with dictation software and grammatical errors may exist.    Chief Complaint   Patient presents with    Follow-up        HPI: Kuldip Espinal is a 81 y.o. year old male patient who has a past medical history of a CAD With Stenting, a Chronic Anticoagulation With Xarelto, a Paroxysmal Atrial Fibrillation, b Hypertension, b Microalbuminuria, b Stage 3 CKD.  He is status post right L3/4 and L4/5 transforaminal ALINA on 08/22/2022 with 99% relief of his previous right-sided radicular pain.  Today he reports his low back and leg pain as a 0/10.  Overall, he is satisfied with relief of the ALINA.  He denies any new numbness, weakness or changes with his bowel or bladder function.  He is still reporting worsening left shoulder pain.  He was recently at the emergency room for this and they did advanced imaging which did not show any significant changes.  He continues take gabapentin 100 mg daily with on known relief      Prior HPI: Kuldip Espinal is a 81 y.o. year old male patient who has a past medical history of a CAD With Stenting, a Chronic Anticoagulation With Xarelto, a Paroxysmal Atrial Fibrillation, b Hypertension, b Microalbuminuria, b Stage 3 CKD, c Hypercholesterolemia With Low HDL, c Hypertriglyceridemia, d Type 2 Diabetes Mellitus, i Dyspnea On Exertion, i Pulmonary Interstitial Fibrosis On 03/2015 Chest CT, j Asymptomatic Cholelithiasis, j Colon CA S/P Partial Colectomy 2008, j Diverticulosis, k Benign Prostate Hypertrophy, k Bilateral Renal Cysts, k Elevated PSA Levels, l GEORGES positive, l Bilateral Hand Swelling And Pain, l Bilateral Thumb MCP Joint Osteoarthritis, l Chronic left shoulder pain, l Chronic Lower Back Pain, l H/O Left TKR 02/2013, l Lumbar Spinal DDD, l Thoracic Spinal DDD, n Situational Anxiety And Depression, q Chronic Eczema, q Recurrent Urticaria Vasculitis, q Toenail Onychomycosis, q Vitamin D deficiency, and Wellness Visit 4/22/2022. He presents in referral from No ref.  provider found for back pain.  He reports having back pain and right hip pain for over a year.  He states that the pain is  Located in his right lateral thigh and low back.  Is worse with sitting for a long time, longer he sits, if he cannot extend his leg pain worsens.  It also hurts with walking.  The pain is across the right side of his back and right lateral thigh, does get some relief with lying down and rest.  Also gets some relief with Tylenol and ibuprofen.  He tried physical therapy for about 4 weeks and this did not seem to improve it, actually aggravated the pain.  He denies any maggie weakness or numbness.        Pain intervention history:  He had a right GTB injection with no major improvement.  He is status post right L3/4 and L4/5 transforaminal ALINA on 08/22/2022 with 99% relief of his previous right-sided radicular pain.     Spine surgeries:    Antineuropathics: Gabapentin 100mg   Just in the morning, no side effects but no benefits just yet  NSAIDs:  Physical therapy:Did greater than 4 weeks of physical therapy at [place] several months ago, no benefit with this.  Antidepressants:  Muscle relaxers:  Opioids:  Antiplatelets/Anticoagulants: Xarelto 15mg        ROS:  He reports cough and back pain.  Balance of review of systems is negative.    Lab Results   Component Value Date    LABA1C 5.4 05/22/2018    HGBA1C 6.0 (H) 04/21/2022       Lab Results   Component Value Date    WBC 8.7 04/21/2022    HGB 15.5 04/21/2022    HCT 46.4 04/21/2022    MCV 91.7 04/21/2022     04/21/2022             Past Medical History:   Diagnosis Date    a CAD With Stenting     Dr. Eleuterio Knott At [facility]    a Chronic Anticoagulation With Xarelto ###    Dr. Eleuterio Knott At [facility]    a Paroxysmal Atrial Fibrillation ###    Dr. Eleuterio Knott At [facility]    b Hypertension     Norvasc 5 mg D/Cd On 8/28/15    b Microalbuminuria     On Ramipril 10 Mg BID    b Stage 3 CKD ###    4/22/22 Referred To Dr. Leandro Live; He Used To See  Dr. Kuldip hollis Hypercholesterolemia With Low HDL     On Crestor 20 Mg qHS    c Hypertriglyceridemia     On Lovaza 2 Gm Bid    d Type 2 Diabetes Mellitus     12/5/17 RXd Trulicity 0.75 Mg SQ Weekly; On Amary 2 Mg Daily; D/Cd Glucotrol 10 Mg Bid; 11/22/16 Added Januvia 50 Mg Daily To His Glucotrol 10 Mg Bid; He Has Had Full ADA Training With Bree Shepard    i Dyspnea On Exertion ###    i Pulmonary Interstitial Fibrosis On 03/2015 Chest CT ###    9/9/20 Referred To Dr. Colin benavidez Asymptomatic Cholelithiasis ###    2/4/19 CT Urogram (D.I.S.) = Report Scanned    j Colon CA S/P Partial Colectomy 2008     Dr. Hayes benavidez Diverticulosis     Dr. Hayes martinez Benign Prostate Hypertrophy     Dr. Vernon Solano; Dr. Norris Florian Retired; On Cialis 5 Mg Daily For This    k Bilateral Renal Cysts     Dr. Vernon Solano; 2/4/19 CT Urogram (D.I.S.) = Report Scanned    k Elevated PSA Levels ###    Dr. Vernon Solano; Dr. Norris Florian Retired; I Always Send Lab Results To Dr. Russ art GEORGES positive     Dr. Adeola Sarkar; 6/3/19 RF, UA Level, And ESR = Normal    l Bilateral Hand Swelling And Pain     2/22/19 Bilateral Hand XRays = (See Report)    l Bilateral Thumb MCP Joint Osteoarthritis     2/22/19 Bilateral Hand XRays = (See Report)    l Chronic left shoulder pain     Dr. Heike art Chronic Lower Back Pain     l H/O Left TKR 02/2013     Dr. Heike art Lumbar Spinal DDD     4/22/22 Referred To Dr. Santos art Thoracic Spinal DDD     On 03/2015 Chest CT     n Situational Anxiety And Depression     RTC 6 Weeks; RXd Wellbutrin  Mg qAM; Lexapro Also Worked Well    q Chronic Eczema     9/5/19 Referred To Dr. Behzad King; Dr. Adeola Sarkar; Dr. Erna Gastelum    q Recurrent Urticaria Vasculitis ########    Dr. Adeola Sarkar    q Toenail Onychomycosis     3/5/20 Referred To Dr. Wolfgang Cruz; 7/17/15 RXd Lamisil 250 Mg Daily X 3 Months    q Vitamin D deficiency     9/9/20 RXd OTC D3 5K  "IU daily; 5/28/18 Increased OTC D3 5K IU Daily To 10K IU Daily    Wellness Visit 4/22/2022        Past Surgical History:   Procedure Laterality Date    APPENDECTOMY  2008    CARDIAC SURGERY  2008    stent placement    CATARACT EXTRACTION Bilateral     COLECTOMY  2008    EYE SURGERY  1996    retina detatchment    HERNIA REPAIR      JOINT REPLACEMENT Left     knee    TRANSFORAMINAL EPIDURAL INJECTION OF STEROID Right 8/22/2022    Procedure: Injection,steroid,epidural,transforaminal approach L3/4 and L4/5;  Surgeon: Santos Kent MD;  Location: [facility] OR;  Service: Pain Management;  Laterality: Right;       Social History     Socioeconomic History    Marital status:    Tobacco Use    Smoking status: Former    Smokeless tobacco: Never         Medications/Allergies: See med card    Vitals:    09/12/22 1310   BP: 133/77   Pulse: 81   SpO2: 98%   Weight: 79.8 kg (175 lb 14.8 oz)   Height: 5' 11" (1.803 m)   PainSc:   6   PainLoc: Shoulder       Body mass index is 24.54 kg/m².    Physical exam:    Gen: A and O x3, pleasant, well-groomed  Skin: No rashes or obvious lesions  HEENT: PERRLA, no obvious deformities on ears or in canals. Trachea midline.  CVS: Regular rate and rhythm, normal palpable pulses.  Resp:No increased work of breathing, symmetrical chest rise.  Abdomen: Soft, NT/ND.  Musculoskeletal: No antalgic gait.       Neuro:    Iliopsoas Quadriceps Knee  Flexion Tibialis  anterior Gastro- cnemius EHL   Lower: R 5/5 5/5 5/5 5/5 5/5 5/5    L 5/5 5/5 5/5 5/5 5/5 5/5      Left  Right    Patellar DTR 0+ 0+   Achilles DTR 0+ 0+   Khan Absent  Absent   Clonus Absent Absent   Babinski Absent Absent     Intact and symmetrical to light touch and pinprick in L1-S1 dermatomes bilaterally.    Lumbar spine:  Lumbar spine: ROM is  Moderately reduced with flexion and extension and oblique extension with no major increased pain on any the new hers  Deshaun's test causes no increased pain on either side.    Supine " straight leg raise is negative bilaterally.    Internal and external rotation of the hip causes no increased pain on either side.  Myofascial exam: No tenderness to palpation across lumbar paraspinous muscles. There is tenderness to palpation over the mid and distal lateral thigh but no pain over the greater trochanter itself.    Imagin21 Xray right pelvis/hip:  No fracture is identified.  The femoral head appears to have a normal relation with the acetabulum.  There are vascular calcifications present.  Degenerative changes are noted in the visualized portion of the lower lumbar spine.    2022 MRI L-spine  FINDINGS:  Vertebral column: There is extensive multilevel degenerative change which will be described by level.  There is a moderate levocurvature of the mid and lower lumbar spine and mild dextrocurvature of the upper lumbar spine as seen on the  image.  There is marked disc space narrowing from T12-L1 through the L4-5 levels with moderate disc space narrowing at the L5-S1 level.  This has progressed particularly at the L3-4 level where there is now Modic type 1 degenerative endplate signal change.  All of the discs are desiccated.  Vertebral body height is preserved.  There is no fracture.  There is trace retrolisthesis of L1 on L2, L2 on L3 (exaggerated by osteophyte formation) and trace anterolisthesis of L5 on S1. this was present previously.     Spinal canal, conus, epidural space: The spinal canal is likely developmentally normal.  The conus terminates approximately at the level of superior L1 and is grossly normal in signal intensity.     Findings by level:     On the sagittal images, there is disc space narrowing at the T9-10 and T10-11 levels.  There is also facet joint arthropathy with ligamentum flavum thickening.  There is mild spinal stenosis at the T10-11 level.  There is no cord compression.  There is also moderate right foraminal stenosis at the T10-11 level.  These findings  however are incompletely evaluated.     T11-12: There is moderate disc space narrowing.  There is facet joint arthropathy with ligamentum flavum thickening, left greater than right.  There is mild-to-moderate spinal stenosis and bilateral foraminal stenosis.  Axial images were not obtained through this level on the prior study but the findings do appear to have progressed.     T12-L1: There is marked disc space narrowing.  There is a diffuse disc bulge with osteophytic ridging and superimposed broad central left paracentral disc protrusion.  There is moderate facet joint arthropathy.  There is moderate spinal stenosis with severe left lateral recess and foraminal stenosis as well as moderate right foraminal stenosis.  The findings have progressed.     L1-2: There is marked disc space narrowing, trace retrolisthesis of L1 on L2.  There is a diffuse disc bulge with osteophytic ridging and superimposed broad central and right paracentral disc protrusion/osteophyte.  Previously, there is a very large central left paracentral disc extrusion with caudad extension.  There is facet joint arthropathy.  There is moderate spinal stenosis with severe right and moderate left lateral recess stenosis as well as severe bilateral foraminal stenosis.  L2-3: There is marked disc space narrowing.  There is severe right and moderate left facet joint arthropathy with ligamentum flavum thickening.  There is trace retrolisthesis of L2 on L3.  There is a diffuse disc bulge with osteophytic ridging.  There is moderate to severe spinal stenosis with severe right and moderate left lateral recess stenosis.  There is severe bilateral foraminal stenosis.  The findings have progressed.  L3-4: There is marked disc space narrowing.  There is severe bilateral facet joint arthropathy with ligamentum flavum thickening.  There is a trace left facet joint effusion.  There is a moderate diffuse disc bulge.  There is severe spinal canal, bilateral lateral  recess and right foraminal stenosis.  There is moderate left foraminal stenosis.  The findings have progressed.  L4-5: There is marked disc space narrowing.  There is anterior osteophyte formation with posterior disc extrusion with suspected calcification.  There is facet joint arthropathy.  There is no significant spinal stenosis but there is severe bilateral foraminal stenosis.  These findings were present previously  L5-S1: There is disc space narrowing with trace anterolisthesis of L5 on S1.  There is marked facet joint arthropathy there is no spinal stenosis.  There is moderate bilateral foraminal stenosis without significant change.     Impression:     1. There is extensive multilevel degenerative disc and facet disease.  The findings are discussed in detail by level above.  There is multilevel spinal canal, lateral recess and foraminal stenosis with interval progression at multiple levels.  2. There is no acute fracture.  3. Known multiple right renal cysts    Assessment:   Kuldip Espinal is a 81 y.o. year old male patient who has a past medical history of a CAD With Stenting, a Chronic Anticoagulation With Xarelto, a Paroxysmal Atrial Fibrillation, b Hypertension, b Microalbuminuria, b Stage 3 CKD, c Hypercholesterolemia With Low HDL, c Hypertriglyceridemia, d Type 2 Diabetes Mellitus, i Dyspnea On Exertion, i Pulmonary Interstitial Fibrosis On 03/2015 Chest CT, j Asymptomatic Cholelithiasis, j Colon CA S/P Partial Colectomy 2008, j Diverticulosis, k Benign Prostate Hypertrophy, k Bilateral Renal Cysts, k Elevated PSA Levels, l GEORGES positive, l Bilateral Hand Swelling And Pain, l Bilateral Thumb MCP Joint Osteoarthritis, l Chronic left shoulder pain, l Chronic Lower Back Pain, l H/O Left TKR 02/2013, l Lumbar Spinal DDD, l Thoracic Spinal DDD, n Situational Anxiety And Depression, q Chronic Eczema, q Recurrent Urticaria Vasculitis, q Toenail Onychomycosis, q Vitamin D deficiency, and Wellness Visit 4/22/2022.  He presents in referral from Dr. Evens Mcpherson for back pain.    1. Lumbar radiculopathy        2. Left shoulder pain, unspecified chronicity  Ambulatory referral/consult to Orthopedics      3. Lumbar spondylosis              Plan:  1. Today we discussed his pain and symptoms.  Overall he is doing very well with regard to his lower back pain and radicular pain.   2. For his continued left shoulder pain referral placed to orthopedics for further evaluation.  3. Follow up as needed, in the future we can repeat injection if necessary.

## 2022-09-27 DIAGNOSIS — M25.512 ACUTE PAIN OF LEFT SHOULDER: Primary | ICD-10-CM

## 2022-09-28 ENCOUNTER — OFFICE VISIT (OUTPATIENT)
Dept: ORTHOPEDICS | Facility: CLINIC | Age: 82
End: 2022-09-28
Payer: MEDICARE

## 2022-09-28 ENCOUNTER — HOSPITAL ENCOUNTER (OUTPATIENT)
Dept: RADIOLOGY | Facility: HOSPITAL | Age: 82
Discharge: HOME | End: 2022-09-28
Attending: ORTHOPAEDIC SURGERY
Payer: MEDICARE

## 2022-09-28 VITALS — BODY MASS INDEX: 24.5 KG/M2 | WEIGHT: 175 LBS | HEIGHT: 71 IN

## 2022-09-28 DIAGNOSIS — M25.512 ACUTE PAIN OF LEFT SHOULDER: ICD-10-CM

## 2022-09-28 DIAGNOSIS — M25.512 LEFT SHOULDER PAIN, UNSPECIFIED CHRONICITY: Primary | ICD-10-CM

## 2022-09-28 DIAGNOSIS — N18.30 STAGE 3 CHRONIC KIDNEY DISEASE, UNSPECIFIED WHETHER STAGE 3A OR 3B CKD: ICD-10-CM

## 2022-09-28 PROCEDURE — 73030 X-RAY EXAM OF SHOULDER: CPT | Mod: TC,PO,LT

## 2022-09-28 PROCEDURE — 99999 PR PBB SHADOW E&M-EST. PATIENT-LVL III: CPT | Mod: PBBFAC,,, | Performed by: ORTHOPAEDIC SURGERY

## 2022-09-28 PROCEDURE — 73030 X-RAY EXAM OF SHOULDER: CPT | Mod: 26,LT,, | Performed by: RADIOLOGY

## 2022-09-28 PROCEDURE — 73030 XR SHOULDER TRAUMA 3 VIEW LEFT: ICD-10-PCS | Mod: 26,LT,, | Performed by: RADIOLOGY

## 2022-09-28 PROCEDURE — 99999 PR PBB SHADOW E&M-EST. PATIENT-LVL III: ICD-10-PCS | Mod: PBBFAC,,, | Performed by: ORTHOPAEDIC SURGERY

## 2022-09-28 PROCEDURE — 99213 OFFICE O/P EST LOW 20 MIN: CPT | Mod: PBBFAC,PN | Performed by: ORTHOPAEDIC SURGERY

## 2022-09-28 PROCEDURE — 20610 LARGE JOINT ASPIRATION/INJECTION: L SUBACROMIAL BURSA: ICD-10-PCS | Mod: S$PBB,LT,, | Performed by: ORTHOPAEDIC SURGERY

## 2022-09-28 PROCEDURE — 20610 DRAIN/INJ JOINT/BURSA W/O US: CPT | Mod: PBBFAC,PN | Performed by: ORTHOPAEDIC SURGERY

## 2022-09-28 PROCEDURE — 99204 OFFICE O/P NEW MOD 45 MIN: CPT | Mod: 25,S$PBB,, | Performed by: ORTHOPAEDIC SURGERY

## 2022-09-28 PROCEDURE — 99204 PR OFFICE/OUTPT VISIT, NEW, LEVL IV, 45-59 MIN: ICD-10-PCS | Mod: 25,S$PBB,, | Performed by: ORTHOPAEDIC SURGERY

## 2022-09-28 RX ORDER — TRIAMCINOLONE ACETONIDE 40 MG/ML
80 INJECTION, SUSPENSION INTRA-ARTICULAR; INTRAMUSCULAR
Status: DISCONTINUED | OUTPATIENT
Start: 2022-09-28 | End: 2022-09-28 | Stop reason: HOSPADM

## 2022-09-28 RX ADMIN — TRIAMCINOLONE ACETONIDE 80 MG: 40 INJECTION, SUSPENSION INTRA-ARTICULAR; INTRAMUSCULAR at 08:09

## 2022-09-28 NOTE — PROGRESS NOTES
82 years old left shoulder pain for about a month start after he fell down some stairs landed on that side been hurting since then doing good days 10 on bad days.  He has tried anti-inflammatories without relief.  Pain nighttime lies on that side     Exam shows positive Neer Manriquez impingement sign, weak and painful cuff strength no signs infection    X-rays are negative     Assessment:  Left shoulder pain times a month probable rotator cuff tear in this 82-year-old    Plan: Kenalog injection left shoulder, encourage rotator cuff strengthening and balancing exercise over time, follow-up as needed     Imaging studies ordered and reviewed by me    Further History  Aching pain  Worse with activity  Relieved with rest  No other associated symptoms  No other radiation    Further Exam  Alert and oriented  Pleasant  Contralateral limb has appropriate range of motion for age and condition  Contralateral limb has appropriate strength for age and condition  Contralateral limb has appropriate stability  for age and condition  No adenopathy  Pulses are appropriate for current condition  Skin is intact        Chief Complaint    Chief Complaint   Patient presents with    Left Shoulder - Pain, Injury       HPI  Kuldip Espinal is a 82 y.o.  male who presents with       Past Medical History  Past Medical History:   Diagnosis Date    a CAD With Stenting     Dr. Eleuterio Knott At [facility]    a Chronic Anticoagulation With Xarelto ###    Dr. Eleuterio Knott At [facility]    a Paroxysmal Atrial Fibrillation ###    Dr. Eleuterio Knott At [facility]    b Hypertension     Norvasc 5 mg D/Cd On 8/28/15    b Microalbuminuria     On Ramipril 10 Mg BID    b Stage 3 CKD ###    4/22/22 Referred To Dr. Leandro Live; He Used To See Dr. Kuldip hollis Hypercholesterolemia With Low HDL     On Crestor 20 Mg qHS    c Hypertriglyceridemia     On Lovaza 2 Gm Bid    d Type 2 Diabetes Mellitus     12/5/17 RXd Trulicity 0.75 Mg SQ Weekly; On Amary 2 Mg Daily; D/Cd  Glucotrol 10 Mg Bid; 11/22/16 Added Januvia 50 Mg Daily To His Glucotrol 10 Mg Bid; He Has Had Full ADA Training With Bree Shepard    i Dyspnea On Exertion ###    i Pulmonary Interstitial Fibrosis On 03/2015 Chest CT ###    9/9/20 Referred To Dr. Colin benavidez Asymptomatic Cholelithiasis ###    2/4/19 CT Urogram (D.I.S.) = Report Scanned    j Colon CA S/P Partial Colectomy 2008     Dr. Hayes benavidez Diverticulosis     Dr. Hayes martinez Benign Prostate Hypertrophy     Dr. Vernon Solano; Dr. Norris Florian Retired; On Cialis 5 Mg Daily For This    k Bilateral Renal Cysts     Dr. Vernon Solano; 2/4/19 CT Urogram (D.I.S.) = Report Scanned    k Elevated PSA Levels ###    Dr. Vernon Solano; Dr. Norris Florian Retired; I Always Send Lab Results To Dr. Russ art GEORGES positive     Dr. Adeola Sarkar; 6/3/19 RF, UA Level, And ESR = Normal    l Bilateral Hand Swelling And Pain     2/22/19 Bilateral Hand XRays = (See Report)    l Bilateral Thumb MCP Joint Osteoarthritis     2/22/19 Bilateral Hand XRays = (See Report)    l Chronic left shoulder pain     Dr. Heike art Chronic Lower Back Pain     l H/O Left TKR 02/2013     Dr. Heike art Lumbar Spinal DDD     4/22/22 Referred To Dr. Santos art Thoracic Spinal DDD     On 03/2015 Chest CT     n Situational Anxiety And Depression     RTC 6 Weeks; RXd Wellbutrin  Mg qAM; Lexapro Also Worked Well    q Chronic Eczema     9/5/19 Referred To Dr. Behzad King; Dr. Adeola Sarkar; Dr. Erna Gastelum    q Recurrent Urticaria Vasculitis ########    Dr. Adeola Sarkar    q Toenail Onychomycosis     3/5/20 Referred To Dr. Wolfgang Cruz; 7/17/15 RXd Lamisil 250 Mg Daily X 3 Months    q Vitamin D deficiency     9/9/20 RXd OTC D3 5K IU daily; 5/28/18 Increased OTC D3 5K IU Daily To 10K IU Daily    Wellness Visit 4/22/2022        Past Surgical History  Past Surgical History:   Procedure Laterality Date    APPENDECTOMY  2008    CARDIAC SURGERY  2008    stent  placement    CATARACT EXTRACTION Bilateral     COLECTOMY  2008    EYE SURGERY  1996    retina detatchment    HERNIA REPAIR      JOINT REPLACEMENT Left     knee    TRANSFORAMINAL EPIDURAL INJECTION OF STEROID Right 8/22/2022    Procedure: Injection,steroid,epidural,transforaminal approach L3/4 and L4/5;  Surgeon: Santos Kent MD;  Location: [facility];  Service: Pain Management;  Laterality: Right;       Medications  Current Outpatient Medications   Medication Sig    acetaminophen (TYLENOL) 650 MG TbSR Take 1 tablet (650 mg total) by mouth 2 (two) times daily as needed (for mild to moderate pain).    amLODIPine (NORVASC) 2.5 MG tablet Take 2.5 mg by mouth every morning.    aspirin (ECOTRIN) 81 MG EC tablet Take 1 tablet by mouth once daily.    blood sugar diagnostic Strp MARKIE MICROLET LANCETS MISC    blood sugar diagnostic Strp 1 strip by Misc.(Non-Drug; Combo Route) route before breakfast. Diagnosis code E11.9    blood-glucose meter (BLOOD GLUCOSE MONITORING) kit Use as instructed    cephALEXin (KEFLEX) 500 MG capsule as needed. For dental wok    cholecalciferol, vitamin D3, 125 mcg (5,000 unit) Tab Take 1 tablet (5,000 Units total) by mouth once daily.    CRESTOR 20 mg tablet Take 1 tablet by mouth once daily.    cyanocobalamin (VITAMIN B-12) 1000 MCG tablet Take 100 mcg by mouth.    dutasteride (AVODART) 0.5 mg capsule Take 0.5 mg by mouth once daily.     fluocinolone (SYNALAR) 0.01 % Sham Apply topically once daily.    gabapentin (NEURONTIN) 100 MG capsule Take 1 capsule (100 mg total) by mouth 2 (two) times daily as needed (for sciatic pain).    hydroCHLOROthiazide (HYDRODIURIL) 12.5 MG Tab Take 1 tablet (12.5 mg total) by mouth every morning.    hydrOXYchloroQUINE (PLAQUENIL) 200 mg tablet Take 1 tablet (200 mg total) by mouth once daily.    ipratropium (ATROVENT) 42 mcg (0.06 %) nasal spray 2 sprays by Nasal route 3 (three) times daily. Use 2-3 times per day if necessary for chronic nasal drip    lancets  30 gauge Misc USE1 LANCET BEFORE BREAKFAST.    lancets Misc 1 lancet by Misc.(Non-Drug; Combo Route) route before breakfast. Diagnosis code E11.9    metoprolol succinate (TOPROL-XL) 25 MG 24 hr tablet Take 1 tablet (25 mg total) by mouth every morning.    multivitamin (THERAGRAN) per tablet Take 1 tablet by mouth once daily.    mupirocin (BACTROBAN) 2 % ointment Apply topically 3 (three) times daily.    omega-3 acid ethyl esters (LOVAZA) 1 gram capsule Take 2 capsules by mouth 2 (two) times daily.    omeprazole (PRILOSEC) 40 MG capsule Take 1 capsule (40 mg total) by mouth before breakfast. Take on empty stomach 30-60 minutes before meal    ramipril (ALTACE) 10 MG capsule Take 10 mg by mouth 2 (two) times daily.    sildenafiL (VIAGRA) 25 MG tablet Take 25 mg by mouth daily as needed for Erectile Dysfunction.    SITagliptin (JANUVIA) 50 MG Tab Take 1 tablet (50 mg total) by mouth once daily.    triamcinolone acetonide 0.1% (KENALOG) 0.1 % cream APPLY TWICE DAILY TO RASH UP TO 2 WEEKS/MONTH AS NEEDED.    vitamin A-vitamin C-vit E-min Tab Take 1 tablet by mouth once daily. OCUVITE EYE HEALTH FORMULA CAPS    XARELTO 15 mg Tab Take 15 mg by mouth once daily.      No current facility-administered medications for this visit.       Allergies  Review of patient's allergies indicates:   Allergen Reactions    Eptifibatide      Other reaction(s): lowers red Blood cell count drasticly    Metformin      Contraindicated due to chronic kidney insufficiency       Family History  Family History   Problem Relation Age of Onset    Cancer Mother     Heart disease Father     Hyperlipidemia Father     Hypertension Father        Social History  Social History     Socioeconomic History    Marital status:    Tobacco Use    Smoking status: Former    Smokeless tobacco: Never               Review of Systems     Constitutional: Negative    HENT: Negative  Eyes: Negative  Respiratory: Negative  Cardiovascular: Negative  Musculoskeletal:  HPI  Skin: Negative  Neurological: Negative  Hematological: Negative  Endocrine: Negative                 Physical Exam    There were no vitals filed for this visit.  Body mass index is 24.41 kg/m².  Physical Examination:     General appearance -  well appearing, and in no distress  Mental status - awake  Neck - supple  Chest -  symmetric air entry  Heart - normal rate   Abdomen - soft      Assessment     1. Left shoulder pain, unspecified chronicity    2. Stage 3 chronic kidney disease, unspecified whether stage 3a or 3b CKD          Plan

## 2022-09-28 NOTE — PROCEDURES
Large Joint Aspiration/Injection: L subacromial bursa    Date/Time: 9/28/2022 8:45 AM  Performed by: Tio Leigh MD  Authorized by: Tio Leigh MD     Consent Done?:  Yes (Verbal)  Site marked: the procedure site was marked    Prep: patient was prepped and draped in usual sterile fashion      Details:  Needle Size:  21 G  Approach:  Posterior  Location:  Shoulder  Site:  L subacromial bursa  Medications:  80 mg triamcinolone acetonide 40 mg/mL  Patient tolerance:  Patient tolerated the procedure well with no immediate complications

## 2022-11-06 PROBLEM — Z71.89 ACP (ADVANCE CARE PLANNING): Status: ACTIVE | Noted: 2022-11-06

## 2022-11-06 PROBLEM — E11.29 TYPE 2 DIABETES MELLITUS WITH KIDNEY COMPLICATION, WITHOUT LONG-TERM CURRENT USE OF INSULIN: Status: RESOLVED | Noted: 2022-04-22 | Resolved: 2022-11-06

## 2022-11-06 PROBLEM — R07.9 CHEST PAIN: Status: ACTIVE | Noted: 2022-11-06

## 2022-11-06 PROBLEM — R80.9 MICROALBUMINURIA: Status: RESOLVED | Noted: 2022-05-01 | Resolved: 2022-11-06

## 2022-11-06 PROBLEM — M54.16 LUMBAR RADICULOPATHY: Status: RESOLVED | Noted: 2022-08-22 | Resolved: 2022-11-06

## 2022-11-06 PROBLEM — I49.3 PVC'S (PREMATURE VENTRICULAR CONTRACTIONS): Status: RESOLVED | Noted: 2020-08-14 | Resolved: 2022-11-06

## 2022-11-06 PROBLEM — E11.9 DM (DIABETES MELLITUS): Status: ACTIVE | Noted: 2022-11-06

## 2022-11-06 PROBLEM — D69.0 ALLERGIC PURPURA: Status: RESOLVED | Noted: 2021-11-05 | Resolved: 2022-11-06

## 2022-11-08 ENCOUNTER — TELEPHONE (OUTPATIENT)
Dept: NEPHROLOGY | Facility: CLINIC | Age: 82
End: 2022-11-08
Payer: MEDICARE

## 2022-11-08 DIAGNOSIS — N18.30 STAGE 3 CHRONIC KIDNEY DISEASE, UNSPECIFIED WHETHER STAGE 3A OR 3B CKD: Primary | ICD-10-CM

## 2022-11-08 NOTE — TELEPHONE ENCOUNTER
----- Message from Yuliana Mota sent at 11/8/2022  3:20 PM CST -----  Type: Patient Call Back         Who called: Pt          What is the request in detail: Pt called In regarding requesting to speak to the nurse in Dr Live office          Can the clinic reply by [facility]? No          Would the patient rather a call back or a response via [facility]? Call back          Best call back number:[phone] (mobile)          Additional Information:           Thank You

## 2022-11-09 NOTE — TELEPHONE ENCOUNTER
Patient having angiogram tomorrow at [facility] with Dr Alex Ibarra.    RS for first week of December.  Please advise when you will want lab after angiogram or if he needs to be seen sooner.

## 2022-11-11 ENCOUNTER — OUTPATIENT CASE MANAGEMENT (OUTPATIENT)
Dept: ADMINISTRATIVE | Facility: OTHER | Age: 82
End: 2022-11-11
Payer: MEDICARE

## 2022-12-08 ENCOUNTER — TELEPHONE (OUTPATIENT)
Dept: NEPHROLOGY | Facility: CLINIC | Age: 82
End: 2022-12-08

## 2022-12-08 NOTE — TELEPHONE ENCOUNTER
Called to check on pt -  Left message for pt. To call our office - did not show for 10:20am appt/

## 2023-01-03 PROBLEM — F51.01 PRIMARY INSOMNIA: Status: ACTIVE | Noted: 2023-01-03

## 2023-04-04 ENCOUNTER — DOCUMENTATION ONLY (OUTPATIENT)
Dept: NEPHROLOGY | Facility: CLINIC | Age: 83
End: 2023-04-04
Payer: MEDICARE

## 2023-04-04 PROBLEM — R55 SYNCOPE AND COLLAPSE: Status: ACTIVE | Noted: 2023-04-04

## 2023-04-04 PROBLEM — I45.5 SINUS PAUSE: Status: ACTIVE | Noted: 2023-04-04

## 2023-04-04 NOTE — PROGRESS NOTES
Chart reviewed.    His kidney function is labile.    Please check RP pre-procedure.    I will provide f/u later this month with repeat labs.

## 2023-04-07 PROBLEM — M75.102 LEFT ROTATOR CUFF TEAR ARTHROPATHY: Status: ACTIVE | Noted: 2023-04-07

## 2023-04-07 PROBLEM — M12.812 LEFT ROTATOR CUFF TEAR ARTHROPATHY: Status: ACTIVE | Noted: 2023-04-07

## 2023-04-26 ENCOUNTER — OUTPATIENT CASE MANAGEMENT (OUTPATIENT)
Dept: ADMINISTRATIVE | Facility: OTHER | Age: 83
End: 2023-04-26
Payer: MEDICARE

## 2023-04-26 ENCOUNTER — TELEPHONE (OUTPATIENT)
Dept: NEPHROLOGY | Facility: CLINIC | Age: 83
End: 2023-04-26
Payer: MEDICARE

## 2023-04-26 NOTE — TELEPHONE ENCOUNTER
----- Message from Leandro Live MD sent at 4/4/2023 10:38 AM CDT -----  Make him a hosp f/u within the month with rp

## 2023-04-26 NOTE — LETTER
April 26, 2023    Kuldip CORTES Teddy  [address] [place] [address]             [facility] [address] [facility] [place] [address] Dear Mr. Espinal,    I work with [facility] Outpatient Case Management Department. We received a referral to call you to discuss your medical history.These services are free of charge and are offered to [facility] patients who have recently been discharged from any of our facilities or who have complex medical conditions that may require the skill of a nurse to assist with management.         .      I attempted to reach you by telephone, but I was unsuccessful. Please call our department so that we can go over some questions with you regarding your health.    The Outpatient Case Management Department can be reached at [phone] from 8:00AM to 4:30 PM on Monday thru Friday. [facility] also has a program where a nurse is available 24/7 to answer questions or provide medical advice, their number is [phone].    Thanks,  Nataliia Ortiz, ALEXN, RN, CCM [facility]  [phone]    Outpatient Care Management

## 2023-04-28 NOTE — TELEPHONE ENCOUNTER
Left message on voicemail for patient to call me back so we can discuss him coming in May 8 or 9 (I will have to schedule.)

## 2023-06-05 ENCOUNTER — OFFICE VISIT (OUTPATIENT)
Dept: NEPHROLOGY | Facility: CLINIC | Age: 83
End: 2023-06-05
Payer: MEDICARE

## 2023-06-05 VITALS — BODY MASS INDEX: 24.41 KG/M2 | SYSTOLIC BLOOD PRESSURE: 130 MMHG | DIASTOLIC BLOOD PRESSURE: 68 MMHG | HEIGHT: 71 IN

## 2023-06-05 DIAGNOSIS — E11.22 TYPE 2 DIABETES MELLITUS WITH STAGE 3 CHRONIC KIDNEY DISEASE, WITHOUT LONG-TERM CURRENT USE OF INSULIN, UNSPECIFIED WHETHER STAGE 3A OR 3B CKD: ICD-10-CM

## 2023-06-05 DIAGNOSIS — N28.1 KIDNEY CYSTS: ICD-10-CM

## 2023-06-05 DIAGNOSIS — I10 PRIMARY HYPERTENSION: ICD-10-CM

## 2023-06-05 DIAGNOSIS — N18.30 TYPE 2 DIABETES MELLITUS WITH STAGE 3 CHRONIC KIDNEY DISEASE, WITHOUT LONG-TERM CURRENT USE OF INSULIN, UNSPECIFIED WHETHER STAGE 3A OR 3B CKD: ICD-10-CM

## 2023-06-05 DIAGNOSIS — N18.30 STAGE 3 CHRONIC KIDNEY DISEASE, UNSPECIFIED WHETHER STAGE 3A OR 3B CKD: Primary | ICD-10-CM

## 2023-06-05 PROCEDURE — 99999 PR PBB SHADOW E&M-EST. PATIENT-LVL III: ICD-10-PCS | Mod: PBBFAC,,, | Performed by: INTERNAL MEDICINE

## 2023-06-05 PROCEDURE — 99999 PR PBB SHADOW E&M-EST. PATIENT-LVL III: CPT | Mod: PBBFAC,,, | Performed by: INTERNAL MEDICINE

## 2023-06-05 PROCEDURE — 99213 OFFICE O/P EST LOW 20 MIN: CPT | Mod: PBBFAC,PN | Performed by: INTERNAL MEDICINE

## 2023-06-05 PROCEDURE — 99214 OFFICE O/P EST MOD 30 MIN: CPT | Mod: S$PBB,,, | Performed by: INTERNAL MEDICINE

## 2023-06-05 PROCEDURE — 99214 PR OFFICE/OUTPT VISIT, EST, LEVL IV, 30-39 MIN: ICD-10-PCS | Mod: S$PBB,,, | Performed by: INTERNAL MEDICINE

## 2023-06-05 NOTE — PROGRESS NOTES
"  Subjective:       Patient ID: Kuldip Espinal is a 82 y.o. White male who presents for return patient evaluation for chronic renal failure.      He states he had a renal biopsy in the past but does not know the results.  He takes ibuprofen regularly for R sciatica.  He has no uremic or urinary symptoms and is in his usual state of health.  He had a cardiac stent placed since his last visit.      Review of Systems   Constitutional:  Negative for appetite change, chills and fever.   HENT:  Negative for congestion.    Eyes:  Negative for visual disturbance.   Respiratory:  Negative for cough and shortness of breath.    Cardiovascular:  Negative for chest pain and leg swelling.   Gastrointestinal:  Negative for abdominal pain, diarrhea, nausea and vomiting.   Genitourinary:  Negative for difficulty urinating, dysuria and hematuria.   Musculoskeletal:  Negative for myalgias.   Skin:  Negative for rash.   Neurological:  Negative for headaches.   Psychiatric/Behavioral:  Negative for sleep disturbance.        The past medical, family and social histories were reviewed for this encounter.     /68 (BP Location: Right arm, Patient Position: Sitting, BP Method: Medium (Manual))   Ht 5' 11" (1.803 m)   BMI 24.41 kg/m²     Objective:      Physical Exam  Vitals reviewed.   Constitutional:       General: He is not in acute distress.     Appearance: He is well-developed.   HENT:      Head: Normocephalic and atraumatic.   Eyes:      General: No scleral icterus.     Conjunctiva/sclera: Conjunctivae normal.   Neck:      Vascular: No JVD.   Cardiovascular:      Rate and Rhythm: Normal rate and regular rhythm.      Heart sounds: Normal heart sounds. No murmur heard.    No friction rub. No gallop.   Pulmonary:      Effort: Pulmonary effort is normal. No respiratory distress.      Breath sounds: Normal breath sounds. No wheezing or rales.   Abdominal:      General: Bowel sounds are normal. There is no distension.      Palpations: " Abdomen is soft.      Tenderness: There is no abdominal tenderness.   Musculoskeletal:      Cervical back: Normal range of motion.      Right lower leg: No edema.      Left lower leg: No edema.   Skin:     General: Skin is warm and dry.      Findings: No rash.   Neurological:      Mental Status: He is alert and oriented to person, place, and time.   Psychiatric:         Mood and Affect: Mood normal.         Behavior: Behavior normal.       Assessment:       1. Stage 3 chronic kidney disease, unspecified whether stage 3a or 3b CKD    2. Primary hypertension    3. Bilateral Renal Cysts    4. Type 2 diabetes mellitus with stage 3 chronic kidney disease, without long-term current use of insulin, unspecified whether stage 3a or 3b CKD        Lab Results   Component Value Date    CREATININE 1.71 (H) 12/12/2022    BUN 35 (H) 12/12/2022     12/12/2022    K 3.8 12/12/2022     12/12/2022    CO2 30 12/12/2022     Lab Results   Component Value Date    CALCIUM 9.4 12/12/2022     Lab Results   Component Value Date    HCT 48.2 12/12/2022     No results found for: UTPCR  Plan:   Return to clinic in 4 months.  Labs for next visit include rp pth upc per standing orders.  He gets his labs at [facility].  Baseline creatinine is 1.4-1.8 since 2015.  He is not on an ACE inhibitor or ARB.  He was on ramipril last year but this was stopped by cardiology.  Blood pressure is controlled on the current regimen.  Continue current medications as prescribed and reviewed.   Please have patient follow-up with your PCP or Endocrinology regarding the control and management of diabetes.  Please avoid or minimize all NSAIDS (ibuprofen, motrin, aleve, indocin, naprosyn) to minimize the risk to your kidneys.  Aspirin in a dose of 325 mg or less a day is likely the safest with regards to kidney function.  If you are able to take aspirin and do not have any bleeding problems or ulcers, this may be your best therapy.  Alternatively, acetaminophen  (Tylenol) is the safer than NSAIDs with regards to kidney function.  I would ask you take this as directed on the bottle.  It is best to stay under 2 grams a day (4-500 mg tablets a day maximum).

## 2023-09-28 PROBLEM — R07.9 CHEST PAIN: Status: RESOLVED | Noted: 2022-11-06 | Resolved: 2023-09-28

## 2023-09-28 PROBLEM — N18.30 STAGE III CHRONIC KIDNEY DISEASE: Status: ACTIVE | Noted: 2023-09-28

## 2023-09-28 PROBLEM — E11.9 DM (DIABETES MELLITUS): Status: RESOLVED | Noted: 2022-11-06 | Resolved: 2023-09-28

## 2023-09-28 PROBLEM — E11.65 TYPE 2 DIABETES MELLITUS WITH HYPERGLYCEMIA, WITHOUT LONG-TERM CURRENT USE OF INSULIN: Status: ACTIVE | Noted: 2023-09-28

## 2023-10-05 ENCOUNTER — TELEPHONE (OUTPATIENT)
Dept: NEPHROLOGY | Facility: CLINIC | Age: 83
End: 2023-10-05
Payer: MEDICARE

## 2023-10-06 ENCOUNTER — OFFICE VISIT (OUTPATIENT)
Dept: NEPHROLOGY | Facility: CLINIC | Age: 83
End: 2023-10-06
Payer: MEDICARE

## 2023-10-06 VITALS — DIASTOLIC BLOOD PRESSURE: 76 MMHG | SYSTOLIC BLOOD PRESSURE: 128 MMHG

## 2023-10-06 DIAGNOSIS — I10 PRIMARY HYPERTENSION: ICD-10-CM

## 2023-10-06 DIAGNOSIS — N28.1 KIDNEY CYSTS: ICD-10-CM

## 2023-10-06 DIAGNOSIS — N18.32 TYPE 2 DIABETES MELLITUS WITH STAGE 3B CHRONIC KIDNEY DISEASE, WITHOUT LONG-TERM CURRENT USE OF INSULIN: ICD-10-CM

## 2023-10-06 DIAGNOSIS — N18.32 STAGE 3B CHRONIC KIDNEY DISEASE: Primary | ICD-10-CM

## 2023-10-06 DIAGNOSIS — E11.22 TYPE 2 DIABETES MELLITUS WITH STAGE 3B CHRONIC KIDNEY DISEASE, WITHOUT LONG-TERM CURRENT USE OF INSULIN: ICD-10-CM

## 2023-10-06 PROCEDURE — 99213 OFFICE O/P EST LOW 20 MIN: CPT | Mod: PBBFAC,PN | Performed by: INTERNAL MEDICINE

## 2023-10-06 PROCEDURE — 99214 OFFICE O/P EST MOD 30 MIN: CPT | Mod: S$PBB,,, | Performed by: INTERNAL MEDICINE

## 2023-10-06 PROCEDURE — 99999 PR PBB SHADOW E&M-EST. PATIENT-LVL III: CPT | Mod: PBBFAC,,, | Performed by: INTERNAL MEDICINE

## 2023-10-06 PROCEDURE — 99999 PR PBB SHADOW E&M-EST. PATIENT-LVL III: ICD-10-PCS | Mod: PBBFAC,,, | Performed by: INTERNAL MEDICINE

## 2023-10-06 PROCEDURE — 99214 PR OFFICE/OUTPT VISIT, EST, LEVL IV, 30-39 MIN: ICD-10-PCS | Mod: S$PBB,,, | Performed by: INTERNAL MEDICINE

## 2023-10-06 NOTE — PROGRESS NOTES
Subjective:       Patient ID: Kuldip Espinal is a 83 y.o. White male who presents for return patient evaluation for chronic renal failure.      He states he had a renal biopsy in the past but does not know the results.  He has no uremic or urinary symptoms and is in his usual state of health.  He will have an ablation this month.      Review of Systems   Constitutional:  Negative for appetite change, chills and fever.   HENT:  Negative for congestion.    Eyes:  Negative for visual disturbance.   Respiratory:  Positive for shortness of breath (with exertion). Negative for cough.    Cardiovascular:  Positive for palpitations. Negative for chest pain and leg swelling.   Gastrointestinal:  Negative for abdominal pain, diarrhea, nausea and vomiting.   Genitourinary:  Negative for difficulty urinating, dysuria and hematuria.   Musculoskeletal:  Positive for arthralgias and gait problem. Negative for myalgias.   Skin:  Negative for rash.   Neurological:  Negative for headaches.   Psychiatric/Behavioral:  Negative for sleep disturbance.        The past medical, family and social histories were reviewed for this encounter.     /76 (BP Location: Left arm, Patient Position: Sitting, BP Method: Medium (Manual))     Objective:      Physical Exam  Vitals reviewed.   Constitutional:       General: He is not in acute distress.     Appearance: He is well-developed.   HENT:      Head: Normocephalic and atraumatic.   Eyes:      General: No scleral icterus.     Conjunctiva/sclera: Conjunctivae normal.   Neck:      Vascular: No JVD.   Cardiovascular:      Rate and Rhythm: Normal rate. Rhythm irregular.      Heart sounds: Normal heart sounds. No murmur heard.     No friction rub. No gallop.   Pulmonary:      Effort: Pulmonary effort is normal. No respiratory distress.      Breath sounds: Normal breath sounds. No wheezing or rales.   Abdominal:      General: Bowel sounds are normal. There is no distension.      Palpations:  "Abdomen is soft.      Tenderness: There is no abdominal tenderness.   Musculoskeletal:      Cervical back: Normal range of motion.      Right lower leg: No edema.      Left lower leg: No edema.   Skin:     General: Skin is warm and dry.      Findings: No rash.   Neurological:      Mental Status: He is alert and oriented to person, place, and time.   Psychiatric:         Mood and Affect: Mood normal.         Behavior: Behavior normal.         Assessment:       1. Stage 3b chronic kidney disease    2. Primary hypertension    3. Bilateral Renal Cysts    4. Type 2 diabetes mellitus with stage 3b chronic kidney disease, without long-term current use of insulin        Lab Results   Component Value Date    CREATININE 2.02 (H) 09/27/2023    BUN 30 (H) 08/03/2023     08/03/2023    K 3.7 08/03/2023     08/03/2023    CO2 30 08/03/2023     Lab Results   Component Value Date    CALCIUM 8.7 08/03/2023     Lab Results   Component Value Date    HCT 42.1 08/03/2023     No results found for: "UTPCR"  Plan:   Return to clinic in 6 months.  Labs for next visit include rp pth upc per standing orders q 3 months per standing orders.  He gets his labs at [facility].  Baseline creatinine is 1.4-1.8 since 2015.  He is not on an ACE inhibitor or ARB.  He was on ramipril last year but this was stopped by cardiology.  Blood pressure is controlled on the current regimen.  Please have patient follow-up with your PCP or Endocrinology regarding the control and management of diabetes.  Please avoid or minimize all NSAIDS (ibuprofen, motrin, aleve, indocin, naprosyn) to minimize the risk to your kidneys.  Aspirin in a dose of 325 mg or less a day is likely the safest with regards to kidney function.  If you are able to take aspirin and do not have any bleeding problems or ulcers, this may be your best therapy.  Alternatively, acetaminophen (Tylenol) is the safer than NSAIDs with regards to kidney function.  I would ask you take this as " directed on the bottle.  It is best to stay under 2 grams a day (4-500 mg tablets a day maximum).  We will need to repeat his RP given that he just received IV contrast.

## 2023-10-10 LAB
ALBUMIN SERPL-MCNC: 4.5 G/DL (ref 3.6–5.1)
BUN SERPL-MCNC: 28 MG/DL (ref 7–25)
BUN/CREAT SERPL: 16 (CALC) (ref 6–22)
CALCIUM SERPL-MCNC: 9.1 MG/DL (ref 8.6–10.3)
CHLORIDE SERPL-SCNC: 106 MMOL/L (ref 98–110)
CO2 SERPL-SCNC: 29 MMOL/L (ref 20–32)
CREAT SERPL-MCNC: 1.74 MG/DL (ref 0.7–1.22)
CREAT UR-MCNC: 126 MG/DL (ref 20–320)
EGFR: 38 ML/MIN/1.73M2
GLUCOSE SERPL-MCNC: 132 MG/DL (ref 65–99)
PHOSPHATE SERPL-MCNC: 3.5 MG/DL (ref 2.1–4.3)
POTASSIUM SERPL-SCNC: 4.3 MMOL/L (ref 3.5–5.3)
PROT UR-MCNC: 18 MG/DL (ref 5–25)
PROT/CREAT UR: 0.14 MG/MG CREAT (ref 0.03–0.15)
PROT/CREAT UR: 143 MG/G CREAT (ref 25–148)
PTH-INTACT SERPL-MCNC: 108 PG/ML (ref 16–77)
SODIUM SERPL-SCNC: 143 MMOL/L (ref 135–146)

## 2023-10-13 PROBLEM — Z86.79 S/P ABLATION OF ATRIAL FIBRILLATION: Status: ACTIVE | Noted: 2023-10-13

## 2023-10-13 PROBLEM — Z98.890 S/P ABLATION OF ATRIAL FIBRILLATION: Status: ACTIVE | Noted: 2023-10-13

## 2024-01-19 PROBLEM — I65.23 BILATERAL CAROTID ARTERY STENOSIS: Status: ACTIVE | Noted: 2024-01-19

## 2024-01-19 PROBLEM — Z00.00 PREVENTATIVE HEALTH CARE: Status: RESOLVED | Noted: 2024-01-19 | Resolved: 2024-01-19

## 2024-01-19 PROBLEM — Z00.00 PREVENTATIVE HEALTH CARE: Status: ACTIVE | Noted: 2024-01-19

## 2024-05-02 ENCOUNTER — OFFICE VISIT (OUTPATIENT)
Dept: PAIN MEDICINE | Facility: CLINIC | Age: 84
End: 2024-05-02
Payer: MEDICARE

## 2024-05-02 ENCOUNTER — TELEPHONE (OUTPATIENT)
Dept: PAIN MEDICINE | Facility: CLINIC | Age: 84
End: 2024-05-02
Payer: MEDICARE

## 2024-05-02 VITALS
WEIGHT: 177.13 LBS | HEIGHT: 71 IN | BODY MASS INDEX: 24.8 KG/M2 | SYSTOLIC BLOOD PRESSURE: 128 MMHG | HEART RATE: 78 BPM | DIASTOLIC BLOOD PRESSURE: 72 MMHG

## 2024-05-02 DIAGNOSIS — M47.816 LUMBAR SPONDYLOSIS: ICD-10-CM

## 2024-05-02 DIAGNOSIS — M54.16 LUMBAR RADICULOPATHY: Primary | ICD-10-CM

## 2024-05-02 PROCEDURE — 99214 OFFICE O/P EST MOD 30 MIN: CPT | Mod: S$PBB,,, | Performed by: ANESTHESIOLOGY

## 2024-05-02 PROCEDURE — 99213 OFFICE O/P EST LOW 20 MIN: CPT | Mod: PBBFAC,PO | Performed by: ANESTHESIOLOGY

## 2024-05-02 PROCEDURE — 99999 PR PBB SHADOW E&M-EST. PATIENT-LVL III: CPT | Mod: PBBFAC,,, | Performed by: ANESTHESIOLOGY

## 2024-05-02 RX ORDER — ALPRAZOLAM 1 MG/1
1 TABLET, ORALLY DISINTEGRATING ORAL ONCE AS NEEDED
Status: CANCELLED | OUTPATIENT
Start: 2024-05-02 | End: 2035-09-29

## 2024-05-02 NOTE — PROGRESS NOTES
This note was completed with dictation software and grammatical errors may exist.    Chief Complaint   Patient presents with    Hip Pain    Leg Pain     Left hip and leg pain         HPI: Kuldip Espinal is a 83 y.o. year old male patient who has a past medical history of a CAD With Stenting, a Chronic Anticoagulation With Xarelto, a Paroxysmal Atrial Fibrillation, b Hypertension, b Microalbuminuria, b Stage 3 CKD.  The patient returns in follow-up today, he was doing well after an injection that we did several years ago, states that he again has pain in the right hip, right lateral thigh down to the knee.  He states that it is worse with sitting too long or driving, does have pain with walking but sometimes standing up and moving feels better than sitting down.  He reports having feelings of weakness in his right leg at times but this is not constant.  He denies any falls.            Prior HPI:  He reports having back pain and right hip pain for over a year.  He states that the pain is  Located in his right lateral thigh and low back.  Is worse with sitting for a long time, longer he sits, if he cannot extend his leg pain worsens.  It also hurts with walking.  The pain is across the right side of his back and right lateral thigh, does get some relief with lying down and rest.  Also gets some relief with Tylenol and ibuprofen.  He tried physical therapy for about 4 weeks and this did not seem to improve it, actually aggravated the pain.  He denies any maggie weakness or numbness.        Pain intervention history:  He had a right GTB injection with no major improvement.  He is status post right L3/4 and L4/5 transforaminal ALINA on 08/22/2022 with 99% relief of his previous right-sided radicular pain.     Spine surgeries:    Antineuropathics: Gabapentin 100mg   Just in the morning, no side effects but no benefits just yet  NSAIDs:  Physical therapy:Did greater than 4 weeks of physical therapy at [place] several months  ago, no benefit with this.  Antidepressants:  Muscle relaxers:  Opioids:  Antiplatelets/Anticoagulants: Brilinta        ROS:  He reports cough and back pain.  Balance of review of systems is negative.    Lab Results   Component Value Date    LABA1C 5.4 05/22/2018    HGBA1C 6.4 (H) 03/06/2023       Lab Results   Component Value Date    WBC 7.37 08/03/2023    HGB 14.3 08/03/2023    HCT 42.1 08/03/2023    MCV 91 08/03/2023     08/03/2023             Past Medical History:   Diagnosis Date    a CAD With Stenting     Dr. Lj Lora    a Chronic Anticoagulation With Xarelto     Dr. Lj Lora    a Mild Bilateral Carotid Artery Stenosis     Will Repeat A Carotid U/S In 4 Years; 4/18/23 Bilateral Carotid AA U/S = Mild BICA Disease (See Report)    a Paroxysmal Atrial Fibrillation S/P RFA In 10/2023     Dr. Lj Lora    Anticoagulant long-term use     b Hypertension     Norvasc 5 mg D/Cd On 8/28/15    b Microalbuminuria     On Ramipril 10 Mg BID    b Stage 3 Chronic Kidney Disease ###    4/22/22 Referred To Dr. Leandro Live; He Used To See Dr. Kuldip hollis Hypercholesterolemia With Low HDL     On Crestor 20 Mg qHS    c Hypertriglyceridemia     On Lovaza 2 Gm Bid    d Type 2 Diabetes Mellitus     12/5/17 RXd Trulicity 0.75 Mg SQ Weekly; On Amary 2 Mg Daily; D/Cd Glucotrol 10 Mg Bid; 11/22/16 Added Januvia 50 Mg Daily To His Glucotrol 10 Mg Bid; He Has Had Full ADA Training With Bree Shepard    i Dyspnea On Exertion     i Pulmonary Interstitial Fibrosis On 03/2015 Chest CT ###    9/9/20 Referred To Dr. Colin benavidez Asymptomatic Cholelithiasis     2/4/19 CT Urogram (D.I.S.) = Report Scanned    j Colon CA S/P Partial Colectomy 2008     Dr. Hayes benavidez Diverticulosis     Dr. Hayes martinez Benign Prostate Hypertrophy     Dr. Vernon Solano; Dr. Norris Florian Retired; On Cialis 5 Mg Daily For This    k Bilateral Renal Cysts     Dr. Vernon Solano; 2/4/19 CT Urogram (D.I.S.) = Report Scanned    k  Elevated PSA Levels ###    Dr. Vernon Solano; Dr. Norris Florian Retired; I Always Send Lab Results To Dr. Russ art GEORGES positive     Dr. Adeola Sarkar; 6/3/19 RF, UA Level, And ESR = Normal    l Bilateral Hand Swelling And Pain     2/22/19 Bilateral Hand XRays = (See Report)    l Bilateral Thumb MCP Joint Osteoarthritis     2/22/19 Bilateral Hand XRays = (See Report)    l Chronic left shoulder pain     Dr. Heike art Chronic Lower Back Pain     l H/O Left TKR 02/2013     Dr. Heike art Lumbar Spinal DDD     4/22/22 Referred To Dr. Santos art Thoracic Spinal DDD     On 03/2015 Chest CT     n Chronic Insomnia     n Situational Anxiety And Depression     RTC 6 Weeks; RXd Wellbutrin  Mg qAM; Lexapro Also Worked Well    q Chronic Eczema     9/5/19 Referred To Dr. Behzad King; Dr. Adeola Sarkar; Dr. Erna Gastelum    q Recurrent Urticaria Vasculitis ####    Dr. Adeola Sarkar    q Toenail Onychomycosis     3/5/20 Referred To Dr. Wolfgang Cruz; 7/17/15 RXd Lamisil 250 Mg Daily X 3 Months    q Vitamin D deficiency     9/9/20 RXd OTC D3 5K IU daily; 5/28/18 Increased OTC D3 5K IU Daily To 10K IU Daily    Wellness Visit 1/19/2024        Past Surgical History:   Procedure Laterality Date    ABLATION  10/12/2023    Procedure: Ablation A-Fib;  Surgeon: Lj Lora III, MD;  Location: [facility] CATH;  Service: Cardiology;;    ANGIOGRAM, CORONARY, WITH LEFT HEART CATHETERIZATION  8/3/2023    Procedure: Left Heart Cath;  Surgeon: Bharat Diaz MD;  Location: [facility] CATH;  Service: Cardiology;;    APPENDECTOMY  2008    CARDIAC SURGERY  2008    stent placement    CATARACT EXTRACTION Bilateral     COLECTOMY  2008    CORONARY ANGIOGRAPHY N/A 11/10/2022    Procedure: ANGIOGRAM, CORONARY ARTERY;  Surgeon: Alex Ibarra MD;  Location: [facility] CATH;  Service: Cardiology;  Laterality: N/A;    CORONARY ANGIOPLASTY WITH STENT PLACEMENT  8/3/2023    Procedure: RAMILA LCX;  Surgeon: Bharat Diaz MD;  Location: [facility]  CATH;  Service: Cardiology;;    CORONARY STENT PLACEMENT N/A 11/10/2022    Procedure: INSERTION, STENT, CORONARY ARTERY;  Surgeon: Alex Ibarra MD;  Location: [facility] CATH;  Service: Cardiology;  Laterality: N/A;    EYE SURGERY      retina detatchment    HERNIA REPAIR      INSERTION OF IMPLANTABLE LOOP RECORDER N/A 2023    Procedure: Insertion Loop Recorder-[employer]tr[employer];  Surgeon: Alex Ibarra MD;  Location: [facility] CATH;  Service: Cardiology;  Laterality: N/A;    INSTANTANEOUS WAVE-FREE RATIO (IFR)  8/3/2023    Procedure: (IFR) LAD;  Surgeon: Bharat Diaz MD;  Location: [facility] CATH;  Service: Cardiology;;    JOINT REPLACEMENT Left     knee    LEFT HEART CATHETERIZATION Left 11/10/2022    Procedure: Left heart cath;  Surgeon: Alex Ibarra MD;  Location: [facility] CATH;  Service: Cardiology;  Laterality: Left;    PERCUTANEOUS CORONARY INTERVENTION, ARTERY  8/3/2023    Procedure: PTCA OM2;  Surgeon: Bharat Diaz MD;  Location: [facility] CATH;  Service: Cardiology;;    TRANSESOPHAGEAL ECHOCARDIOGRAM WITH POSSIBLE CARDIOVERSION (AMRIK W/ POSS CARDIOVERSION) N/A 3/22/2023    Procedure: TRANSESOPHAGEAL ECHOCARDIOGRAM WITH POSSIBLE CARDIOVERSION (AMRIK W/ POSS CARDIOVERSION);  Surgeon: Alex Ibarra MD;  Location: [facility];  Service: Cardiology;  Laterality: N/A;    TRANSFORAMINAL EPIDURAL INJECTION OF STEROID Right 2022    Procedure: Injection,steroid,epidural,transforaminal approach L3/4 and L4/5;  Surgeon: Santos Kent MD;  Location: [facility] OR;  Service: Pain Management;  Laterality: Right;       Social History     Socioeconomic History    Marital status:    Tobacco Use    Smoking status: Former     Current packs/day: 0.00     Types: Cigarettes     Quit date:      Years since quittin.3    Smokeless tobacco: Never   Substance and Sexual Activity    Alcohol use: Not Currently     Comment: occasional    Drug use: Not Currently     Social Determinants of Health     Financial Resource  "Strain: Low Risk  (11/11/2022)    Overall Financial Resource Strain (CARDIA)     Difficulty of Paying Living Expenses: Not hard at all   Food Insecurity: No Food Insecurity (11/11/2022)    Hunger Vital Sign     Worried About Running Out of Food in the Last Year: Never true     Ran Out of Food in the Last Year: Never true   Transportation Needs: No Transportation Needs (11/11/2022)    PRAPARE - Transportation     Lack of Transportation (Medical): No     Lack of Transportation (Non-Medical): No   Physical Activity: Insufficiently Active (11/11/2022)    Exercise Vital Sign     Days of Exercise per Week: 5 days     Minutes of Exercise per Session: 20 min   Stress: No Stress Concern Present (11/11/2022)    [employer] - Occupational Stress Questionnaire     Feeling of Stress : Not at all   Housing Stability: Low Risk  (11/11/2022)    Housing Stability Vital Sign     Unable to Pay for Housing in the Last Year: No     Number of Places Lived in the Last Year: 1     Unstable Housing in the Last Year: No         Medications/Allergies: See med card    Vitals:    05/02/24 1018   BP: 128/72   Pulse: 78   Weight: 80.3 kg (177 lb 2.2 oz)   Height: 5' 11" (1.803 m)   PainSc:   8   PainLoc: Hip       Body mass index is 24.71 kg/m².    Physical exam:  Gen: A and O x3, pleasant, well-groomed  Skin: No rashes or obvious lesions  HEENT: PERRLA, no obvious deformities on ears or in canals. Trachea midline.  CVS: Regular rate and rhythm, normal palpable pulses.  Resp:No increased work of breathing, symmetrical chest rise.  Abdomen: Soft, NT/ND.  Musculoskeletal: No antalgic gait.       Neuro:    Iliopsoas Quadriceps Knee  Flexion Tibialis  anterior Gastro- cnemius EHL   Lower: R 5/5 5/5 5/5 5/5 5/5 5/5    L 5/5 5/5 5/5 5/5 5/5 5/5      Left  Right    Patellar DTR 0+ 0+   Achilles DTR 0+ 0+   Khan Absent  Absent   Clonus Absent Absent   Babinski Absent Absent     Intact and symmetrical to light touch and " pinprick in L1-S1 dermatomes bilaterally.    Lumbar spine:  Lumbar spine: ROM is  Moderately reduced with flexion and extension and oblique extension with no major increased pain on any the new hers  Deshaun's test causes no increased pain on either side.    Supine straight leg raise is negative bilaterally.    Internal and external rotation of the hip causes no increased pain on either side.  Myofascial exam: No tenderness to palpation across lumbar paraspinous muscles. There is tenderness to palpation over the mid and distal lateral thigh but no pain over the greater trochanter itself.    Imagin21 Xray right pelvis/hip:  No fracture is identified.  The femoral head appears to have a normal relation with the acetabulum.  There are vascular calcifications present.  Degenerative changes are noted in the visualized portion of the lower lumbar spine.    2022 MRI L-spine  FINDINGS:  Vertebral column: There is extensive multilevel degenerative change which will be described by level.  There is a moderate levocurvature of the mid and lower lumbar spine and mild dextrocurvature of the upper lumbar spine as seen on the  image.  There is marked disc space narrowing from T12-L1 through the L4-5 levels with moderate disc space narrowing at the L5-S1 level.  This has progressed particularly at the L3-4 level where there is now Modic type 1 degenerative endplate signal change.  All of the discs are desiccated.  Vertebral body height is preserved.  There is no fracture.  There is trace retrolisthesis of L1 on L2, L2 on L3 (exaggerated by osteophyte formation) and trace anterolisthesis of L5 on S1. this was present previously.     Spinal canal, conus, epidural space: The spinal canal is likely developmentally normal.  The conus terminates approximately at the level of superior L1 and is grossly normal in signal intensity.     Findings by level:   On the sagittal images, there is disc space narrowing at the  T9-10 and T10-11 levels.  There is also facet joint arthropathy with ligamentum flavum thickening.  There is mild spinal stenosis at the T10-11 level.  There is no cord compression.  There is also moderate right foraminal stenosis at the T10-11 level.  These findings however are incompletely evaluated.   T11-12: There is moderate disc space narrowing.  There is facet joint arthropathy with ligamentum flavum thickening, left greater than right.  There is mild-to-moderate spinal stenosis and bilateral foraminal stenosis.  Axial images were not obtained through this level on the prior study but the findings do appear to have progressed.   T12-L1: There is marked disc space narrowing.  There is a diffuse disc bulge with osteophytic ridging and superimposed broad central left paracentral disc protrusion.  There is moderate facet joint arthropathy.  There is moderate spinal stenosis with severe left lateral recess and foraminal stenosis as well as moderate right foraminal stenosis.  The findings have progressed.   L1-2: There is marked disc space narrowing, trace retrolisthesis of L1 on L2.  There is a diffuse disc bulge with osteophytic ridging and superimposed broad central and right paracentral disc protrusion/osteophyte.  Previously, there is a very large central left paracentral disc extrusion with caudad extension.  There is facet joint arthropathy.  There is moderate spinal stenosis with severe right and moderate left lateral recess stenosis as well as severe bilateral foraminal stenosis.  L2-3: There is marked disc space narrowing.  There is severe right and moderate left facet joint arthropathy with ligamentum flavum thickening.  There is trace retrolisthesis of L2 on L3.  There is a diffuse disc bulge with osteophytic ridging.  There is moderate to severe spinal stenosis with severe right and moderate left lateral recess stenosis.  There is severe bilateral foraminal stenosis.  The findings have  progressed.  L3-4: There is marked disc space narrowing.  There is severe bilateral facet joint arthropathy with ligamentum flavum thickening.  There is a trace left facet joint effusion.  There is a moderate diffuse disc bulge.  There is severe spinal canal, bilateral lateral recess and right foraminal stenosis.  There is moderate left foraminal stenosis.  The findings have progressed.  L4-5: There is marked disc space narrowing.  There is anterior osteophyte formation with posterior disc extrusion with suspected calcification.  There is facet joint arthropathy.  There is no significant spinal stenosis but there is severe bilateral foraminal stenosis.  These findings were present previously  L5-S1: There is disc space narrowing with trace anterolisthesis of L5 on S1.  There is marked facet joint arthropathy there is no spinal stenosis.  There is moderate bilateral foraminal stenosis without significant change.   Impression:   1. There is extensive multilevel degenerative disc and facet disease.  The findings are discussed in detail by level above.  There is multilevel spinal canal, lateral recess and foraminal stenosis with interval progression at multiple levels.  2. There is no acute fracture.  3. Known multiple right renal cysts    Assessment:   Kuldip Espinal is a 83 y.o. year old male patient who has a past medical history of a CAD With Stenting, a Chronic Anticoagulation With Xarelto, a Paroxysmal Atrial Fibrillation, b Hypertension, b Microalbuminuria, b Stage 3 CKD, c Hypercholesterolemia With Low HDL, c Hypertriglyceridemia, d Type 2 Diabetes Mellitus, i Dyspnea On Exertion, i Pulmonary Interstitial Fibrosis On 03/2015 Chest CT, j Asymptomatic Cholelithiasis, j Colon CA S/P Partial Colectomy 2008, j Diverticulosis, k Benign Prostate Hypertrophy, k Bilateral Renal Cysts, k Elevated PSA Levels, l GEORGES positive, l Bilateral Hand Swelling And Pain, l Bilateral Thumb MCP Joint Osteoarthritis, l Chronic left  shoulder pain, l Chronic Lower Back Pain, l H/O Left TKR 02/2013, l Lumbar Spinal DDD, l Thoracic Spinal DDD, n Situational Anxiety And Depression, q Chronic Eczema, q Recurrent Urticaria Vasculitis, q Toenail Onychomycosis, q Vitamin D deficiency, and Wellness Visit 4/22/2022. He presents in referral from Dr. Evens Mcpherson for back pain.    1. Lumbar radiculopathy        2. Lumbar spondylosis            Plan:  1.  We reviewed his MRI from 02/02 2, he states that the pain is very similar to what he had in the past when he had an injection and his pain had resolved.  I am going to set him up for another right L3/4 and L4/5 transforaminal injection and have him follow up in several weeks after the injection or sooner as needed.  He will need to get clearance to hold his antiplatelet medication.

## 2024-05-02 NOTE — TELEPHONE ENCOUNTER
Physician - Dr Kent    Type of Procedure/Injection - Lumbar Transforaminal Epidural  L3/4 and L4/5           Laterality - Right      Anxiolysis- Local      Need to hold medication - Yes      Brilinta for 7 days          Clearance needed - Yes      Follow up - 3 week

## 2024-05-02 NOTE — TELEPHONE ENCOUNTER
Spoke with patient to schedule. Patient requests a call back tomorrow due to being in a doctors appointment with his wife.    Patient's case request has been placed

## 2024-05-02 NOTE — H&P (VIEW-ONLY)
This note was completed with dictation software and grammatical errors may exist.    Chief Complaint   Patient presents with    Hip Pain    Leg Pain     Left hip and leg pain         HPI: Kuldip Espinal is a 83 y.o. year old male patient who has a past medical history of a CAD With Stenting, a Chronic Anticoagulation With Xarelto, a Paroxysmal Atrial Fibrillation, b Hypertension, b Microalbuminuria, b Stage 3 CKD.  The patient returns in follow-up today, he was doing well after an injection that we did several years ago, states that he again has pain in the right hip, right lateral thigh down to the knee.  He states that it is worse with sitting too long or driving, does have pain with walking but sometimes standing up and moving feels better than sitting down.  He reports having feelings of weakness in his right leg at times but this is not constant.  He denies any falls.            Prior HPI:  He reports having back pain and right hip pain for over a year.  He states that the pain is  Located in his right lateral thigh and low back.  Is worse with sitting for a long time, longer he sits, if he cannot extend his leg pain worsens.  It also hurts with walking.  The pain is across the right side of his back and right lateral thigh, does get some relief with lying down and rest.  Also gets some relief with Tylenol and ibuprofen.  He tried physical therapy for about 4 weeks and this did not seem to improve it, actually aggravated the pain.  He denies any maggie weakness or numbness.        Pain intervention history:  He had a right GTB injection with no major improvement.  He is status post right L3/4 and L4/5 transforaminal ALINA on 08/22/2022 with 99% relief of his previous right-sided radicular pain.     Spine surgeries:    Antineuropathics: Gabapentin 100mg   Just in the morning, no side effects but no benefits just yet  NSAIDs:  Physical therapy:Did greater than 4 weeks of physical therapy at [facility] several months  ago, no benefit with this.  Antidepressants:  Muscle relaxers:  Opioids:  Antiplatelets/Anticoagulants: Brilinta        ROS:  He reports cough and back pain.  Balance of review of systems is negative.    Lab Results   Component Value Date    LABA1C 5.4 05/22/2018    HGBA1C 6.4 (H) 03/06/2023       Lab Results   Component Value Date    WBC 7.37 08/03/2023    HGB 14.3 08/03/2023    HCT 42.1 08/03/2023    MCV 91 08/03/2023     08/03/2023             Past Medical History:   Diagnosis Date    a CAD With Stenting     Dr. Lj Lora    a Chronic Anticoagulation With Xarelto     Dr. Lj Lora    a Mild Bilateral Carotid Artery Stenosis     Will Repeat A Carotid U/S In 4 Years; 4/18/23 Bilateral Carotid AA U/S = Mild BICA Disease (See Report)    a Paroxysmal Atrial Fibrillation S/P RFA In 10/2023     Dr. Lj Lora    Anticoagulant long-term use     b Hypertension     Norvasc 5 mg D/Cd On 8/28/15    b Microalbuminuria     On Ramipril 10 Mg BID    b Stage 3 Chronic Kidney Disease ###    4/22/22 Referred To Dr. Leandro Live; He Used To See Dr. Kuldip hollis Hypercholesterolemia With Low HDL     On Crestor 20 Mg qHS    c Hypertriglyceridemia     On Lovaza 2 Gm Bid    d Type 2 Diabetes Mellitus     12/5/17 RXd Trulicity 0.75 Mg SQ Weekly; On Amary 2 Mg Daily; D/Cd Glucotrol 10 Mg Bid; 11/22/16 Added Januvia 50 Mg Daily To His Glucotrol 10 Mg Bid; He Has Had Full ADA Training With Bree Shepard    i Dyspnea On Exertion     i Pulmonary Interstitial Fibrosis On 03/2015 Chest CT ###    9/9/20 Referred To Dr. Colin benavidez Asymptomatic Cholelithiasis     2/4/19 CT Urogram (D.I.S.) = Report Scanned    j Colon CA S/P Partial Colectomy 2008     Dr. Hayes benavidez Diverticulosis     Dr. Hayes martinez Benign Prostate Hypertrophy     Dr. Vernon Solano; Dr. Norris Florian Retired; On Cialis 5 Mg Daily For This    k Bilateral Renal Cysts     Dr. Vernon Solano; 2/4/19 CT Urogram (D.I.S.) = Report Scanned    k  Elevated PSA Levels ###    Dr. Vernon Solano; Dr. Norris Florian Retired; I Always Send Lab Results To Dr. Russ art GEORGES positive     Dr. Adeola Sarkar; 6/3/19 RF, UA Level, And ESR = Normal    l Bilateral Hand Swelling And Pain     2/22/19 Bilateral Hand XRays = (See Report)    l Bilateral Thumb MCP Joint Osteoarthritis     2/22/19 Bilateral Hand XRays = (See Report)    l Chronic left shoulder pain     Dr. Heike art Chronic Lower Back Pain     l H/O Left TKR 02/2013     Dr. Heike art Lumbar Spinal DDD     4/22/22 Referred To Dr. Santos art Thoracic Spinal DDD     On 03/2015 Chest CT     n Chronic Insomnia     n Situational Anxiety And Depression     RTC 6 Weeks; RXd Wellbutrin  Mg qAM; Lexapro Also Worked Well    q Chronic Eczema     9/5/19 Referred To Dr. Behzad King; Dr. Adeola Sarkar; Dr. Erna Gastelum    q Recurrent Urticaria Vasculitis ####    Dr. Adeola Sarkar    q Toenail Onychomycosis     3/5/20 Referred To Dr. Wolfgang Cruz; 7/17/15 RXd Lamisil 250 Mg Daily X 3 Months    q Vitamin D deficiency     9/9/20 RXd OTC D3 5K IU daily; 5/28/18 Increased OTC D3 5K IU Daily To 10K IU Daily    Wellness Visit 1/19/2024        Past Surgical History:   Procedure Laterality Date    ABLATION  10/12/2023    Procedure: Ablation A-Fib;  Surgeon: Lj Lora III, MD;  Location: [facility];  Service: Cardiology;;    ANGIOGRAM, CORONARY, WITH LEFT HEART CATHETERIZATION  8/3/2023    Procedure: Left Heart Cath;  Surgeon: Bharat Diaz MD;  Location: [facility];  Service: Cardiology;;    APPENDECTOMY  2008    CARDIAC SURGERY  2008    stent placement    CATARACT EXTRACTION Bilateral     COLECTOMY  2008    CORONARY ANGIOGRAPHY N/A 11/10/2022    Procedure: ANGIOGRAM, CORONARY ARTERY;  Surgeon: Alex Ibarra MD;  Location: [facility];  Service: Cardiology;  Laterality: N/A;    CORONARY ANGIOPLASTY WITH STENT PLACEMENT  8/3/2023    Procedure: RAMILA LCX;  Surgeon: Bharat Diaz MD;  Location: [facility];  Service: Cardiology;;    CORONARY STENT PLACEMENT N/A 11/10/2022    Procedure: INSERTION, STENT, CORONARY ARTERY;  Surgeon: Alex Ibarra MD;  Location: [facility] CATH;  Service: Cardiology;  Laterality: N/A;    EYE SURGERY      retina detatchment    HERNIA REPAIR      INSERTION OF IMPLANTABLE LOOP RECORDER N/A 2023    Procedure: Insertion Loop Recorder-[employer]tr[employer];  Surgeon: Alex Ibarra MD;  Location: [facility] CATH;  Service: Cardiology;  Laterality: N/A;    INSTANTANEOUS WAVE-FREE RATIO (IFR)  8/3/2023    Procedure: (IFR) LAD;  Surgeon: Bharat Diaz MD;  Location: [facility] CATH;  Service: Cardiology;;    JOINT REPLACEMENT Left     knee    LEFT HEART CATHETERIZATION Left 11/10/2022    Procedure: Left heart cath;  Surgeon: Alex Ibarra MD;  Location: [facility] CATH;  Service: Cardiology;  Laterality: Left;    PERCUTANEOUS CORONARY INTERVENTION, ARTERY  8/3/2023    Procedure: PTCA OM2;  Surgeon: Bharat Diaz MD;  Location: [facility] CATH;  Service: Cardiology;;    TRANSESOPHAGEAL ECHOCARDIOGRAM WITH POSSIBLE CARDIOVERSION (AMRIK W/ POSS CARDIOVERSION) N/A 3/22/2023    Procedure: TRANSESOPHAGEAL ECHOCARDIOGRAM WITH POSSIBLE CARDIOVERSION (AMRIK W/ POSS CARDIOVERSION);  Surgeon: Alex Ibarra MD;  Location: [facility];  Service: Cardiology;  Laterality: N/A;    TRANSFORAMINAL EPIDURAL INJECTION OF STEROID Right 2022    Procedure: Injection,steroid,epidural,transforaminal approach L3/4 and L4/5;  Surgeon: Santos Kent MD;  Location: [facility] OR;  Service: Pain Management;  Laterality: Right;       Social History     Socioeconomic History    Marital status:    Tobacco Use    Smoking status: Former     Current packs/day: 0.00     Types: Cigarettes     Quit date:      Years since quittin.3    Smokeless tobacco: Never   Substance and Sexual Activity    Alcohol use: Not Currently     Comment: occasional    Drug use: Not Currently     Social Determinants of Health     Financial Resource  "Strain: Low Risk  (11/11/2022)    Overall Financial Resource Strain (CARDIA)     Difficulty of Paying Living Expenses: Not hard at all   Food Insecurity: No Food Insecurity (11/11/2022)    Hunger Vital Sign     Worried About Running Out of Food in the Last Year: Never true     Ran Out of Food in the Last Year: Never true   Transportation Needs: No Transportation Needs (11/11/2022)    PRAPARE - Transportation     Lack of Transportation (Medical): No     Lack of Transportation (Non-Medical): No   Physical Activity: Insufficiently Active (11/11/2022)    Exercise Vital Sign     Days of Exercise per Week: 5 days     Minutes of Exercise per Session: 20 min   Stress: No Stress Concern Present (11/11/2022)    [employer] [place] [employer] - Occupational Stress Questionnaire     Feeling of Stress : Not at all   Housing Stability: Low Risk  (11/11/2022)    Housing Stability Vital Sign     Unable to Pay for Housing in the Last Year: No     Number of Places Lived in the Last Year: 1     Unstable Housing in the Last Year: No         Medications/Allergies: See med card    Vitals:    05/02/24 1018   BP: 128/72   Pulse: 78   Weight: 80.3 kg (177 lb 2.2 oz)   Height: 5' 11" (1.803 m)   PainSc:   8   PainLoc: Hip       Body mass index is 24.71 kg/m².    Physical exam:  Gen: A and O x3, pleasant, well-groomed  Skin: No rashes or obvious lesions  HEENT: PERRLA, no obvious deformities on ears or in canals. Trachea midline.  CVS: Regular rate and rhythm, normal palpable pulses.  Resp:No increased work of breathing, symmetrical chest rise.  Abdomen: Soft, NT/ND.  Musculoskeletal: No antalgic gait.       Neuro:    Iliopsoas Quadriceps Knee  Flexion Tibialis  anterior Gastro- cnemius EHL   Lower: R 5/5 5/5 5/5 5/5 5/5 5/5    L 5/5 5/5 5/5 5/5 5/5 5/5      Left  Right    Patellar DTR 0+ 0+   Achilles DTR 0+ 0+   Khan Absent  Absent   Clonus Absent Absent   Babinski Absent Absent     Intact and symmetrical to light touch and " pinprick in L1-S1 dermatomes bilaterally.    Lumbar spine:  Lumbar spine: ROM is  Moderately reduced with flexion and extension and oblique extension with no major increased pain on any the new hers  Deshaun's test causes no increased pain on either side.    Supine straight leg raise is negative bilaterally.    Internal and external rotation of the hip causes no increased pain on either side.  Myofascial exam: No tenderness to palpation across lumbar paraspinous muscles. There is tenderness to palpation over the mid and distal lateral thigh but no pain over the greater trochanter itself.    Imagin21 Xray right pelvis/hip:  No fracture is identified.  The femoral head appears to have a normal relation with the acetabulum.  There are vascular calcifications present.  Degenerative changes are noted in the visualized portion of the lower lumbar spine.    2022 MRI L-spine  FINDINGS:  Vertebral column: There is extensive multilevel degenerative change which will be described by level.  There is a moderate levocurvature of the mid and lower lumbar spine and mild dextrocurvature of the upper lumbar spine as seen on the  image.  There is marked disc space narrowing from T12-L1 through the L4-5 levels with moderate disc space narrowing at the L5-S1 level.  This has progressed particularly at the L3-4 level where there is now Modic type 1 degenerative endplate signal change.  All of the discs are desiccated.  Vertebral body height is preserved.  There is no fracture.  There is trace retrolisthesis of L1 on L2, L2 on L3 (exaggerated by osteophyte formation) and trace anterolisthesis of L5 on S1. this was present previously.     Spinal canal, conus, epidural space: The spinal canal is likely developmentally normal.  The conus terminates approximately at the level of superior L1 and is grossly normal in signal intensity.     Findings by level:   On the sagittal images, there is disc space narrowing at the  T9-10 and T10-11 levels.  There is also facet joint arthropathy with ligamentum flavum thickening.  There is mild spinal stenosis at the T10-11 level.  There is no cord compression.  There is also moderate right foraminal stenosis at the T10-11 level.  These findings however are incompletely evaluated.   T11-12: There is moderate disc space narrowing.  There is facet joint arthropathy with ligamentum flavum thickening, left greater than right.  There is mild-to-moderate spinal stenosis and bilateral foraminal stenosis.  Axial images were not obtained through this level on the prior study but the findings do appear to have progressed.   T12-L1: There is marked disc space narrowing.  There is a diffuse disc bulge with osteophytic ridging and superimposed broad central left paracentral disc protrusion.  There is moderate facet joint arthropathy.  There is moderate spinal stenosis with severe left lateral recess and foraminal stenosis as well as moderate right foraminal stenosis.  The findings have progressed.   L1-2: There is marked disc space narrowing, trace retrolisthesis of L1 on L2.  There is a diffuse disc bulge with osteophytic ridging and superimposed broad central and right paracentral disc protrusion/osteophyte.  Previously, there is a very large central left paracentral disc extrusion with caudad extension.  There is facet joint arthropathy.  There is moderate spinal stenosis with severe right and moderate left lateral recess stenosis as well as severe bilateral foraminal stenosis.  L2-3: There is marked disc space narrowing.  There is severe right and moderate left facet joint arthropathy with ligamentum flavum thickening.  There is trace retrolisthesis of L2 on L3.  There is a diffuse disc bulge with osteophytic ridging.  There is moderate to severe spinal stenosis with severe right and moderate left lateral recess stenosis.  There is severe bilateral foraminal stenosis.  The findings have  progressed.  L3-4: There is marked disc space narrowing.  There is severe bilateral facet joint arthropathy with ligamentum flavum thickening.  There is a trace left facet joint effusion.  There is a moderate diffuse disc bulge.  There is severe spinal canal, bilateral lateral recess and right foraminal stenosis.  There is moderate left foraminal stenosis.  The findings have progressed.  L4-5: There is marked disc space narrowing.  There is anterior osteophyte formation with posterior disc extrusion with suspected calcification.  There is facet joint arthropathy.  There is no significant spinal stenosis but there is severe bilateral foraminal stenosis.  These findings were present previously  L5-S1: There is disc space narrowing with trace anterolisthesis of L5 on S1.  There is marked facet joint arthropathy there is no spinal stenosis.  There is moderate bilateral foraminal stenosis without significant change.   Impression:   1. There is extensive multilevel degenerative disc and facet disease.  The findings are discussed in detail by level above.  There is multilevel spinal canal, lateral recess and foraminal stenosis with interval progression at multiple levels.  2. There is no acute fracture.  3. Known multiple right renal cysts    Assessment:   Kuldip Espinal is a 83 y.o. year old male patient who has a past medical history of a CAD With Stenting, a Chronic Anticoagulation With Xarelto, a Paroxysmal Atrial Fibrillation, b Hypertension, b Microalbuminuria, b Stage 3 CKD, c Hypercholesterolemia With Low HDL, c Hypertriglyceridemia, d Type 2 Diabetes Mellitus, i Dyspnea On Exertion, i Pulmonary Interstitial Fibrosis On 03/2015 Chest CT, j Asymptomatic Cholelithiasis, j Colon CA S/P Partial Colectomy 2008, j Diverticulosis, k Benign Prostate Hypertrophy, k Bilateral Renal Cysts, k Elevated PSA Levels, l GEORGES positive, l Bilateral Hand Swelling And Pain, l Bilateral Thumb MCP Joint Osteoarthritis, l Chronic left  shoulder pain, l Chronic Lower Back Pain, l H/O Left TKR 02/2013, l Lumbar Spinal DDD, l Thoracic Spinal DDD, n Situational Anxiety And Depression, q Chronic Eczema, q Recurrent Urticaria Vasculitis, q Toenail Onychomycosis, q Vitamin D deficiency, and Wellness Visit 4/22/2022. He presents in referral from Dr. Evens Mcpherson for back pain.    1. Lumbar radiculopathy        2. Lumbar spondylosis            Plan:  1.  We reviewed his MRI from 02/02 2, he states that the pain is very similar to what he had in the past when he had an injection and his pain had resolved.  I am going to set him up for another right L3/4 and L4/5 transforaminal injection and have him follow up in several weeks after the injection or sooner as needed.  He will need to get clearance to hold his antiplatelet medication.

## 2024-05-03 ENCOUNTER — TELEPHONE (OUTPATIENT)
Dept: PAIN MEDICINE | Facility: CLINIC | Age: 84
End: 2024-05-03
Payer: MEDICARE

## 2024-05-03 NOTE — TELEPHONE ENCOUNTER
Spoke with patient to schedule. Per provider patient to hold Brilinta x 7 days prior. Message sent to Dr. Ibarra for clearance. Pre op information given and follow up appointment scheduled.

## 2024-05-03 NOTE — TELEPHONE ENCOUNTER
Hi! Patient is scheduled for a lumbar ALINA on 5/27 with Dr. Kent. Provider requests patient hold Brilinta x 7 days prior to injection. Is patient cleared to hold Brilinta for scheduled lumbar epidural injection? Thanks.

## 2024-05-27 ENCOUNTER — HOSPITAL ENCOUNTER (OUTPATIENT)
Facility: HOSPITAL | Age: 84
Discharge: HOME | End: 2024-05-27
Attending: ANESTHESIOLOGY | Admitting: ANESTHESIOLOGY
Payer: MEDICARE

## 2024-05-27 ENCOUNTER — HOSPITAL ENCOUNTER (OUTPATIENT)
Dept: RADIOLOGY | Facility: HOSPITAL | Age: 84
Discharge: HOME | End: 2024-05-27
Attending: ANESTHESIOLOGY
Payer: MEDICARE

## 2024-05-27 VITALS
WEIGHT: 177 LBS | HEART RATE: 71 BPM | HEIGHT: 71 IN | OXYGEN SATURATION: 95 % | SYSTOLIC BLOOD PRESSURE: 143 MMHG | RESPIRATION RATE: 18 BRPM | DIASTOLIC BLOOD PRESSURE: 79 MMHG | TEMPERATURE: 98 F | BODY MASS INDEX: 24.78 KG/M2

## 2024-05-27 DIAGNOSIS — M54.16 LUMBAR RADICULOPATHY: ICD-10-CM

## 2024-05-27 DIAGNOSIS — M54.50 LOWER BACK PAIN: ICD-10-CM

## 2024-05-27 PROCEDURE — 64483 NJX AA&/STRD TFRM EPI L/S 1: CPT | Mod: RT,,, | Performed by: ANESTHESIOLOGY

## 2024-05-27 PROCEDURE — 64483 NJX AA&/STRD TFRM EPI L/S 1: CPT | Mod: PO,RT | Performed by: ANESTHESIOLOGY

## 2024-05-27 PROCEDURE — 64484 NJX AA&/STRD TFRM EPI L/S EA: CPT | Mod: RT,,, | Performed by: ANESTHESIOLOGY

## 2024-05-27 PROCEDURE — 25000003 PHARM REV CODE 250: Mod: PO | Performed by: ANESTHESIOLOGY

## 2024-05-27 PROCEDURE — 76000 FLUOROSCOPY <1 HR PHYS/QHP: CPT | Mod: TC,59,PO

## 2024-05-27 PROCEDURE — 64484 NJX AA&/STRD TFRM EPI L/S EA: CPT | Mod: PO,RT | Performed by: ANESTHESIOLOGY

## 2024-05-27 RX ORDER — ALPRAZOLAM 0.5 MG/1
1 TABLET, ORALLY DISINTEGRATING ORAL ONCE AS NEEDED
Status: COMPLETED | OUTPATIENT
Start: 2024-05-27 | End: 2024-05-27

## 2024-05-27 RX ADMIN — ALPRAZOLAM 0.5 MG: 0.5 TABLET, ORALLY DISINTEGRATING ORAL at 02:05

## 2024-05-27 NOTE — OP NOTE
PROCEDURE DATE: 5/27/2024    PROCEDURE: Right L3/4 and L4/5 transforaminal epidural steroid injection under fluoroscopy    DIAGNOSIS: Lumbar  Radiculopathy    Post op diagnosis: Same    PHYSICIAN: Santos Kent MD    MEDICATIONS INJECTED:  Methylprednisolone 40mg (1ml) and 1ml 0.25% bupivicaine at each nerve root.     LOCAL ANESTHETIC INJECTED:  Lidocaine 1%. 4 ml per site.    SEDATION MEDICATIONS: none    ESTIMATED BLOOD LOSS:  none    COMPLICATIONS:  none    TECHNIQUE:   A time-out was taken to identify patient and procedure side prior to starting the procedure. The patient was placed in a prone position, prepped and draped in the usual sterile fashion using ChloraPrep and sterile towels.  The area to be injected was determined under fluoroscopic guidance in AP and oblique view.  Local anesthetic was given by raising a wheal and going down to the hub of a 25-gauge 1.5 inch needle.  In oblique view, a 3.5 inch 22-gauge bent-tip spinal needle was introduced towards 6 oclock position of the pedicle of each above named nerve root level.  The needle was walked medially then hinged into the neural foramen and position was confirmed in AP and lateral views.  Omnipaque contrast dye was injected to confirm appropriate placement and that there was no vascular uptake.  After negative aspiration for blood or CSF, the medication was then injected. This was performed at the left L3/4 and L4/5 level(s). The patient tolerated the procedure well.    The patient was monitored after the procedure.  Patient was given post procedure and discharge instructions to follow at home. The patient was discharged in a stable condition.

## 2024-05-27 NOTE — DISCHARGE SUMMARY
[facility] - Surgery  Discharge Note  Short Stay    Procedure(s) (LRB):  Injection,steroid,epidural,transforaminal approach    L3/4, L4/5 (Right)      OUTCOME: Patient tolerated treatment/procedure well without complication and is now ready for discharge.    DISPOSITION: Home or Self Care    FINAL DIAGNOSIS:  Lumbar radiculopathy    FOLLOWUP: In clinic    DISCHARGE INSTRUCTIONS:    Discharge Procedure Orders   Diet Adult Regular     No dressing needed     Notify your health care provider if you experience any of the following:  temperature >100.4     Activity as tolerated

## 2024-10-22 PROBLEM — Z95.5 S/P CORONARY ARTERY STENT PLACEMENT: Status: ACTIVE | Noted: 2024-10-22

## 2024-10-22 PROBLEM — G45.9 TIA (TRANSIENT ISCHEMIC ATTACK): Status: ACTIVE | Noted: 2024-10-22

## 2024-10-22 PROBLEM — Z95.5 PRESENCE OF STENT IN LEFT CIRCUMFLEX CORONARY ARTERY: Status: ACTIVE | Noted: 2024-10-22

## 2025-01-30 ENCOUNTER — TELEPHONE (OUTPATIENT)
Dept: NEUROLOGY | Facility: CLINIC | Age: 85
End: 2025-01-30
Payer: MEDICARE

## 2025-01-30 NOTE — TELEPHONE ENCOUNTER
Called patient to schedule appointment from referral for memory changes. No answer. Left message to return call.

## 2025-02-25 PROBLEM — N18.31 STAGE 3A CHRONIC KIDNEY DISEASE: Status: ACTIVE | Noted: 2023-09-28

## 2025-02-25 PROBLEM — E11.9 TYPE 2 DIABETES MELLITUS, WITHOUT LONG-TERM CURRENT USE OF INSULIN: Status: ACTIVE | Noted: 2023-09-28

## 2025-02-26 PROBLEM — R54 AGE-RELATED PHYSICAL DEBILITY: Status: ACTIVE | Noted: 2025-02-26

## 2025-03-05 NOTE — LETTER
November 16, 2017        Behzad King MD  [address] [place] [address] Dr  [facility]  [facility] [address]             [facility]  [address]  [facility] [address]  Phone: [phone]  Fax: [phone]   Patient: Kuldip Espinal   MR Number: [id number]   YOB: 1940   Date of Visit: 11/9/2017       Dear Dr. King:    Thank you for referring Kuldip Espinal to me for evaluation. Attached you will find relevant portions of my assessment and plan of care.    If you have questions, please do not hesitate to call me. I look forward to following Kuldip Espinal along with you.    Sincerely,      Adeola Sarkar, DO            CC  No Recipients    Enclosure         
Rhino Rockvincent

## 2025-03-07 ENCOUNTER — OUTPATIENT CASE MANAGEMENT (OUTPATIENT)
Dept: ADMINISTRATIVE | Facility: OTHER | Age: 85
End: 2025-03-07
Payer: MEDICARE

## 2025-03-14 ENCOUNTER — OUTPATIENT CASE MANAGEMENT (OUTPATIENT)
Dept: ADMINISTRATIVE | Facility: OTHER | Age: 85
End: 2025-03-14
Payer: MEDICARE

## 2025-04-03 ENCOUNTER — OUTPATIENT CASE MANAGEMENT (OUTPATIENT)
Dept: ADMINISTRATIVE | Facility: OTHER | Age: 85
End: 2025-04-03
Payer: MEDICARE

## 2025-04-03 NOTE — PROGRESS NOTES
Outpatient Care Management  Plan of Care Follow Up Visit    Patient: Kuldip Espinal  MRN: [id number]  Date of Service: 04/03/2025  Completed by: Nataliia Ortiz RN  Referral Date: 02/27/2025    Reason for Visit   Patient presents with    Case Closure     Unable to reach.

## 2025-05-29 ENCOUNTER — TELEPHONE (OUTPATIENT)
Dept: NEUROLOGY | Facility: CLINIC | Age: 85
End: 2025-05-29
Payer: MEDICARE

## 2025-05-29 NOTE — TELEPHONE ENCOUNTER
Spoke with patients wife and scheduled new patient appointment from memory loss for 3 pm on 7/17/25 with Bailey Burkett. Time/date/location provided.

## (undated) DEVICE — GLOVE SURGICAL LATEX SZ 7

## (undated) DEVICE — TOWEL OR DISP STRL BLUE 4/PK

## (undated) DEVICE — MARKER SKIN STND TIP BLUE BARR

## (undated) DEVICE — APPLICATOR CHLORAPREP CLR 10.5

## (undated) DEVICE — NDL SPINAL SPINOCAN 22GX3.5

## (undated) DEVICE — TRAY NERVE BLOCK

## (undated) DEVICE — MARKER SKIN RULER STERILE

## (undated) DEVICE — HANDLE SURG LIGHT NONRIGID